# Patient Record
Sex: MALE
[De-identification: names, ages, dates, MRNs, and addresses within clinical notes are randomized per-mention and may not be internally consistent; named-entity substitution may affect disease eponyms.]

---

## 2017-05-11 ENCOUNTER — RX RENEWAL (OUTPATIENT)
Age: 72
End: 2017-05-11

## 2017-09-19 ENCOUNTER — RX RENEWAL (OUTPATIENT)
Age: 72
End: 2017-09-19

## 2017-10-18 ENCOUNTER — TRANSCRIPTION ENCOUNTER (OUTPATIENT)
Age: 72
End: 2017-10-18

## 2017-10-20 ENCOUNTER — NON-APPOINTMENT (OUTPATIENT)
Age: 72
End: 2017-10-20

## 2017-10-20 ENCOUNTER — APPOINTMENT (OUTPATIENT)
Dept: CARDIOLOGY | Facility: CLINIC | Age: 72
End: 2017-10-20
Payer: MEDICARE

## 2017-10-20 VITALS
HEIGHT: 60 IN | SYSTOLIC BLOOD PRESSURE: 104 MMHG | WEIGHT: 196 LBS | OXYGEN SATURATION: 98 % | HEART RATE: 56 BPM | DIASTOLIC BLOOD PRESSURE: 67 MMHG | BODY MASS INDEX: 38.48 KG/M2 | TEMPERATURE: 98.1 F

## 2017-10-20 DIAGNOSIS — E53.8 DEFICIENCY OF OTHER SPECIFIED B GROUP VITAMINS: ICD-10-CM

## 2017-10-20 PROCEDURE — G0008: CPT

## 2017-10-20 PROCEDURE — 90662 IIV NO PRSV INCREASED AG IM: CPT

## 2017-10-20 PROCEDURE — 93000 ELECTROCARDIOGRAM COMPLETE: CPT

## 2017-10-20 PROCEDURE — 99214 OFFICE O/P EST MOD 30 MIN: CPT | Mod: 25

## 2017-10-27 ENCOUNTER — MEDICATION RENEWAL (OUTPATIENT)
Age: 72
End: 2017-10-27

## 2018-10-30 ENCOUNTER — APPOINTMENT (OUTPATIENT)
Dept: CARDIOLOGY | Facility: CLINIC | Age: 73
End: 2018-10-30
Payer: MEDICARE

## 2018-10-30 VITALS
HEART RATE: 60 BPM | WEIGHT: 190 LBS | TEMPERATURE: 97.6 F | BODY MASS INDEX: 29.82 KG/M2 | OXYGEN SATURATION: 99 % | HEIGHT: 67 IN | SYSTOLIC BLOOD PRESSURE: 99 MMHG | DIASTOLIC BLOOD PRESSURE: 65 MMHG

## 2018-10-30 PROCEDURE — 99214 OFFICE O/P EST MOD 30 MIN: CPT

## 2018-11-16 ENCOUNTER — RX RENEWAL (OUTPATIENT)
Age: 73
End: 2018-11-16

## 2018-11-26 ENCOUNTER — MEDICATION RENEWAL (OUTPATIENT)
Age: 73
End: 2018-11-26

## 2019-09-10 ENCOUNTER — TRANSCRIPTION ENCOUNTER (OUTPATIENT)
Age: 74
End: 2019-09-10

## 2019-09-10 ENCOUNTER — APPOINTMENT (OUTPATIENT)
Dept: CARDIOLOGY | Facility: CLINIC | Age: 74
End: 2019-09-10
Payer: MEDICARE

## 2019-09-10 ENCOUNTER — NON-APPOINTMENT (OUTPATIENT)
Age: 74
End: 2019-09-10

## 2019-09-10 ENCOUNTER — LABORATORY RESULT (OUTPATIENT)
Age: 74
End: 2019-09-10

## 2019-09-10 VITALS
HEART RATE: 64 BPM | DIASTOLIC BLOOD PRESSURE: 67 MMHG | HEIGHT: 67 IN | TEMPERATURE: 98.2 F | WEIGHT: 196 LBS | SYSTOLIC BLOOD PRESSURE: 105 MMHG | OXYGEN SATURATION: 96 % | BODY MASS INDEX: 30.76 KG/M2

## 2019-09-10 DIAGNOSIS — R73.09 OTHER ABNORMAL GLUCOSE: ICD-10-CM

## 2019-09-10 PROCEDURE — 93000 ELECTROCARDIOGRAM COMPLETE: CPT

## 2019-09-10 PROCEDURE — 36415 COLL VENOUS BLD VENIPUNCTURE: CPT

## 2019-09-10 PROCEDURE — 99214 OFFICE O/P EST MOD 30 MIN: CPT

## 2019-09-10 NOTE — PHYSICAL EXAM
[General Appearance - Well Developed] : well developed [Normal Appearance] : normal appearance [Well Groomed] : well groomed [General Appearance - Well Nourished] : well nourished [General Appearance - In No Acute Distress] : no acute distress [No Deformities] : no deformities [Normal Conjunctiva] : the conjunctiva exhibited no abnormalities [Normal Oral Mucosa] : normal oral mucosa [Eyelids - No Xanthelasma] : the eyelids demonstrated no xanthelasmas [No Oral Pallor] : no oral pallor [No Oral Cyanosis] : no oral cyanosis [Normal Jugular Venous A Waves Present] : normal jugular venous A waves present [Normal Jugular Venous V Waves Present] : normal jugular venous V waves present [No Jugular Venous Bean A Waves] : no jugular venous bean A waves [Exaggerated Use Of Accessory Muscles For Inspiration] : no accessory muscle use [Respiration, Rhythm And Depth] : normal respiratory rhythm and effort [Heart Rate And Rhythm] : heart rate and rhythm were normal [Auscultation Breath Sounds / Voice Sounds] : lungs were clear to auscultation bilaterally [Murmurs] : no murmurs present [Heart Sounds] : normal S1 and S2 [Abdomen Soft] : soft [Abdomen Tenderness] : non-tender [Abnormal Walk] : normal gait [Abdomen Mass (___ Cm)] : no abdominal mass palpated [Gait - Sufficient For Exercise Testing] : the gait was sufficient for exercise testing [Nail Clubbing] : no clubbing of the fingernails [Cyanosis, Localized] : no localized cyanosis [Petechial Hemorrhages (___cm)] : no petechial hemorrhages [FreeTextEntry1] : No edema. Pedal pulses intact at 1+. No cogwheeling. [] : no rash [Skin Color & Pigmentation] : normal skin color and pigmentation [Skin Lesions] : no skin lesions [No Venous Stasis] : no venous stasis [No Skin Ulcers] : no skin ulcer [No Xanthoma] : no  xanthoma was observed [Oriented To Time, Place, And Person] : oriented to person, place, and time [Mood] : the mood was normal [Affect] : the affect was normal [No Anxiety] : not feeling anxious

## 2019-09-10 NOTE — REVIEW OF SYSTEMS
[Feeling Fatigued] : not feeling fatigued [Recent Weight Gain (___ Lbs)] : recent [unfilled] ~Ulb weight gain [Recent Weight Loss (___ Lbs)] : no recent weight loss [Dyspnea on exertion] : not dyspnea during exertion [Shortness Of Breath] : no shortness of breath [Chest  Pressure] : no chest pressure [Chest Pain] : no chest pain [Lower Ext Edema] : no extremity edema [Palpitations] : no palpitations [Heartburn] : no heartburn [Change in Appetite] : no change in appetite [see HPI] : see HPI [Dizziness] : no dizziness [Anxiety] : no anxiety [Tremor] : a tremor was seen [Under Stress] : not under stress [Negative] : Heme/Lymph

## 2019-09-10 NOTE — HISTORY OF PRESENT ILLNESS
[FreeTextEntry1] : Patient with multiple risk factors for coronary disease. While at [facility] in 1997 had chest pain and an evolving acute anterior wall MI. At [facility], [place] he was felt not to be a candidate for TPA and after an abnormal dobutamine echo on day 3 or 4 he was transferred to [facility]. On July 26, 1997 he had a catheterization which showed LVEF 35% with apical thrombus, virtually occluded LAD, normal circumflex artery, and 60-70% proximal RCA. After 3 days of IV heparin he had repeat catheterization which showed resolution of the thrombus. At that point he had a stent placed in his LAD which now had a 95% occlusion. The RCA was left alone. In December of 1997 there had obviously been some restenosis and he had a stent placed in his proximal LAD which had restenosed to 99% after the first septal branch, and a stent was placed in the proximal RCA which was now 75% narrowed. The patient did well after that although his LVEF at the time was only 30%. I began following the patient after his second angioplasty I have treated him medically since then. He has never required repeat catheterization, hospitalization for cardiac reasons, and has never developed congestive heart failure. His most recent stress tests include a stress echo in 2008 and a stress thallium in 2010. His left ventricular ejection fraction is now greater than 50% and he has no areas of ischemia although he still has a large area of akinesis in the mid septum and the anterior septum and apex. He has done a good job on risk factor reduction in terms of compliance with medical regimen but has not been active with exercise and weight loss. He runs borderline low blood pressures. He has the metabolic syndrome with low HDL and high triglycerides.  He denies chest pain, shortness of breath, or any arrhythmias.\par July 22, 2013. He was last here in November of 2012. At that time he did have a mild tremor of his right arm and his head. He saw Dr. Dariel Barreto of neurology in February who felt that the essential tremor was marked by nerves and did not require medications at this time. He wanted some thyroid function tests and he recommended the patient drink a little red wine. Patient is here today for followup. He Is not exercising only once in his diet but the wife keeps a monitored diet is here today his weight is the same as it was on his last visit.\par March 14, 2014. Patient returns today for followup. He had gained 4 pounds and his diet was not quite so good. He has some symptoms of BPH. He agreed to do a stress echo at his next visit.\par August 12, 2014. The patient is here for followup as well as stress echocardiogram. He denies any symptoms. ( he was a little upset that we were running late.) No interval change in his medications or his regimen. His daughter is due within this coming week in [place]. He was able to exercise for 9 minutes and there was no real change in the echocardiogram\par June 22, 2015. The patient is here for followup. No interval medical issues. No chest pain or shortness of breath. Played tennis the other day but does not really exercise on a regular basis. His wife thinks his tremor may be getting worse and involving his head as well although it doesn't bother the patient enough yet to go back to the neurologist.\par December 14, 2015. Patient is here for followup. No real change his symptoms. Because he moved, he has more up and down walking and he has managed to lose 5 pounds. Denies exertional shortness of breath or chest pain and still plays tennis but now rarely. No syncope or near syncope despite his bradycardia and borderline blood pressure. Only complaint is that his essential tremor seems to be getting worse. I initially added Zetia because LDL was above 70 but the cost was prohibitive for the patient.\par September 19, 2016. Patient is here in followup. He mostly spends time in [place] and has moved to [place]. He is hoping to eventually permanently move to [place], but his wife is still a little resistant. His activity level has decreased and his weight has gone up. He was able to play tennis 3 times for about an hour.  He is New York Heart Association class 2-3 at this point. No chest pressure, tightness, or heaviness. He saw neurology for intentional tremor and has a followup appointment tomorrow. He also went for a sleep study and has very mild sleep apnea and refuses to use the CPAP machine.\par October 20, 2017. First visit in a year. He had switched to Dr. Meneses for internal medicine, and because Dr. Meneses is also a cardiologist, he was seeing him exclusively initially, but now is back here. He claims his labs were okay also with Dr. Mik Alvarenga, whom he has been seeing for gout and is now on allopurinol and colchicine. No change in exercise tolerance or capacity. Weight is actually down 9 pounds from last year. EKG and exam are the same.\par October 30, 2018. First visit in a year once again. Absolutely no medical issues over the past 12 months. The only change is Dr. Meneses added 1 g of fish oil for triglycerides about 6-8 weeks ago and in Dr. Alvarenga increased allopurinol to 300 mg daily, and Colchrys every other day for his gout. Plan one tennis now than before because he is in [place] for half a year. Has lost weight and has absolutely no symptoms. Discussed the pros and cons of stress testing at this point, but we'll probably hold off.\par September 10, 2019.  Patient here in follow-up, this time came with his wife.  No new symptoms.  Still playing tennis a few days a week.  Gained back the 6 pounds he lost but otherwise no complaints.  His allopurinol is up to 300 mg and he is on colchicine.  Labs from Dr. Mik Alvarenga dated September 4 included a normal BMP and a normal CBC.  His uric acid was down to 4.6.  His CRP is 1.6 and his vitamin B12 is 407.  Lipids and a BNP were not done.  No chest pain, shortness of breath, syncope or near syncope.  EKG is sinus bradycardia at 56 with a first-degree block and is unchanged.  He also follows up with Dr. Alanis Meneses as internal medicine. Tremor is improved.

## 2019-09-10 NOTE — HISTORY OF PRESENT ILLNESS
[FreeTextEntry1] : Patient with multiple risk factors for coronary disease. While at [facility] in 1997 had chest pain and an evolving acute anterior wall MI. At [facility] he was felt not to be a candidate for TPA and after an abnormal dobutamine echo on day 3 or 4 he was transferred to [facility]. On July 26, 1997 he had a catheterization which showed LVEF 35% with apical thrombus, virtually occluded LAD, normal circumflex artery, and 60-70% proximal RCA. After 3 days of IV heparin he had repeat catheterization which showed resolution of the thrombus. At that point he had a stent placed in his LAD which now had a 95% occlusion. The RCA was left alone. In December of 1997 there had obviously been some restenosis and he had a stent placed in his proximal LAD which had restenosed to 99% after the first septal branch, and a stent was placed in the proximal RCA which was now 75% narrowed. The patient did well after that although his LVEF at the time was only 30%. I began following the patient after his second angioplasty I have treated him medically since then. He has never required repeat catheterization, hospitalization for cardiac reasons, and has never developed congestive heart failure. His most recent stress tests include a stress echo in 2008 and a stress thallium in 2010. His left ventricular ejection fraction is now greater than 50% and he has no areas of ischemia although he still has a large area of akinesis in the mid septum and the anterior septum and apex. He has done a good job on risk factor reduction in terms of compliance with medical regimen but has not been active with exercise and weight loss. He runs borderline low blood pressures. He has the metabolic syndrome with low HDL and high triglycerides.  He denies chest pain, shortness of breath, or any arrhythmias.\par July 22, 2013. He was last here in November of 2012. At that time he did have a mild tremor of his right arm and his head. He saw Dr. Dariel Barreto of neurology in February who felt that the essential tremor was marked by nerves and did not require medications at this time. He wanted some thyroid function tests and he recommended the patient drink a little red wine. Patient is here today for followup. He Is not exercising only once in his diet but the wife keeps a monitored diet is here today his weight is the same as it was on his last visit.\par March 14, 2014. Patient returns today for followup. He had gained 4 pounds and his diet was not quite so good. He has some symptoms of BPH. He agreed to do a stress echo at his next visit.\par August 12, 2014. The patient is here for followup as well as stress echocardiogram. He denies any symptoms. ( he was a little upset that we were running late.) No interval change in his medications or his regimen. His daughter is due within this coming week in [place]. He was able to exercise for 9 minutes and there was no real change in the echocardiogram\par June 22, 2015. The patient is here for followup. No interval medical issues. No chest pain or shortness of breath. Played tennis the other day but does not really exercise on a regular basis. His wife thinks his tremor may be getting worse and involving his head as well although it doesn't bother the patient enough yet to go back to the neurologist.\par December 14, 2015. Patient is here for followup. No real change his symptoms. Because he moved, he has more up and down walking and he has managed to lose 5 pounds. Denies exertional shortness of breath or chest pain and still plays tennis but now rarely. No syncope or near syncope despite his bradycardia and borderline blood pressure. Only complaint is that his essential tremor seems to be getting worse. I initially added Zetia because LDL was above 70 but the cost was prohibitive for the patient.\par September 19, 2016. Patient is here in followup. He mostly spends time in [place] and has moved to [place]. He is hoping to eventually permanently move to [place], but his wife is still a little resistant. His activity level has decreased and his weight has gone up. He was able to play tennis 3 times for about an hour.  He is New York Heart Association class 2-3 at this point. No chest pressure, tightness, or heaviness. He saw neurology for intentional tremor and has a followup appointment tomorrow. He also went for a sleep study and has very mild sleep apnea and refuses to use the CPAP machine.\par October 20, 2017. First visit in a year. He had switched to Dr. Meneses for internal medicine, and because Dr. Meneses is also a cardiologist, he was seeing him exclusively initially, but now is back here. He claims his labs were okay also with Dr. Mik Alvarenga, whom he has been seeing for gout and is now on allopurinol and colchicine. No change in exercise tolerance or capacity. Weight is actually down 9 pounds from last year. EKG and exam are the same.\par October 30, 2018. First visit in a year once again. Absolutely no medical issues over the past 12 months. The only change is Dr. Meneses added 1 g of fish oil for triglycerides about 6-8 weeks ago and in Dr. Alvarenga increased allopurinol to 300 mg daily, and Colchrys every other day for his gout. Plan one tennis now than before because he is in [place] for half a year. Has lost weight and has absolutely no symptoms. Discussed the pros and cons of stress testing at this point, but we'll probably hold off.\par September 10, 2019.  Patient here in follow-up, this time came with his wife.  No new symptoms.  Still playing tennis a few days a week.  Gained back the 6 pounds he lost but otherwise no complaints.  His allopurinol is up to 300 mg and he is on colchicine.  Labs from Dr. Mik Alvarenga dated September 4 included a normal BMP and a normal CBC.  His uric acid was down to 4.6.  His CRP is 1.6 and his vitamin B12 is 407.  Lipids and a BNP were not done.  No chest pain, shortness of breath, syncope or near syncope.  EKG is sinus bradycardia at 56 with a first-degree block and is unchanged.  He also follows up with Dr. Alanis Meneses as internal medicine. Tremor is improved.

## 2019-09-10 NOTE — PHYSICAL EXAM
[General Appearance - Well Developed] : well developed [Well Groomed] : well groomed [Normal Appearance] : normal appearance [General Appearance - Well Nourished] : well nourished [General Appearance - In No Acute Distress] : no acute distress [No Deformities] : no deformities [Normal Conjunctiva] : the conjunctiva exhibited no abnormalities [Eyelids - No Xanthelasma] : the eyelids demonstrated no xanthelasmas [Normal Oral Mucosa] : normal oral mucosa [No Oral Pallor] : no oral pallor [No Oral Cyanosis] : no oral cyanosis [Normal Jugular Venous A Waves Present] : normal jugular venous A waves present [Normal Jugular Venous V Waves Present] : normal jugular venous V waves present [No Jugular Venous Bean A Waves] : no jugular venous bean A waves [Exaggerated Use Of Accessory Muscles For Inspiration] : no accessory muscle use [Respiration, Rhythm And Depth] : normal respiratory rhythm and effort [Auscultation Breath Sounds / Voice Sounds] : lungs were clear to auscultation bilaterally [Heart Rate And Rhythm] : heart rate and rhythm were normal [Heart Sounds] : normal S1 and S2 [Murmurs] : no murmurs present [Abdomen Soft] : soft [Abdomen Tenderness] : non-tender [Abnormal Walk] : normal gait [Abdomen Mass (___ Cm)] : no abdominal mass palpated [Gait - Sufficient For Exercise Testing] : the gait was sufficient for exercise testing [Cyanosis, Localized] : no localized cyanosis [Nail Clubbing] : no clubbing of the fingernails [Petechial Hemorrhages (___cm)] : no petechial hemorrhages [] : no rash [FreeTextEntry1] : No edema. Pedal pulses intact at 1+. No cogwheeling. [Skin Color & Pigmentation] : normal skin color and pigmentation [No Venous Stasis] : no venous stasis [Skin Lesions] : no skin lesions [No Skin Ulcers] : no skin ulcer [No Xanthoma] : no  xanthoma was observed [Oriented To Time, Place, And Person] : oriented to person, place, and time [Affect] : the affect was normal [Mood] : the mood was normal [No Anxiety] : not feeling anxious

## 2019-09-10 NOTE — REVIEW OF SYSTEMS
[Feeling Fatigued] : not feeling fatigued [Recent Weight Gain (___ Lbs)] : recent [unfilled] ~Ulb weight gain [Recent Weight Loss (___ Lbs)] : no recent weight loss [Dyspnea on exertion] : not dyspnea during exertion [Shortness Of Breath] : no shortness of breath [Chest  Pressure] : no chest pressure [Chest Pain] : no chest pain [Lower Ext Edema] : no extremity edema [Palpitations] : no palpitations [Change in Appetite] : no change in appetite [Heartburn] : no heartburn [Dizziness] : no dizziness [see HPI] : see HPI [Tremor] : a tremor was seen [Anxiety] : no anxiety [Under Stress] : not under stress [Negative] : Endocrine

## 2019-09-10 NOTE — CARDIOLOGY SUMMARY
[No Ischemia] : no Ischemia [Ant Wall Defect] : anterior wall defect [Fixed Defect] : fixed defect [___] : [unfilled] [LVEF ___%] : LVEF [unfilled]% [Enlarged] : enlarged LA size [Moderate] : moderate LV dysfunction [None] : no mitral regurgitation

## 2019-09-12 ENCOUNTER — RX RENEWAL (OUTPATIENT)
Age: 74
End: 2019-09-12

## 2019-09-12 LAB
CHOLEST SERPL-MCNC: 175 MG/DL
CHOLEST/HDLC SERPL: 5.8 RATIO
ESTIMATED AVERAGE GLUCOSE: 120 MG/DL
HBA1C MFR BLD HPLC: 5.8 %
HDLC SERPL-MCNC: 30 MG/DL
LDLC SERPL CALC-MCNC: NORMAL MG/DL
NT-PROBNP SERPL-MCNC: 315 PG/ML
TRIGL SERPL-MCNC: 440 MG/DL
TSH SERPL-ACNC: 1.73 UIU/ML

## 2019-11-20 ENCOUNTER — RECORD ABSTRACTING (OUTPATIENT)
Age: 74
End: 2019-11-20

## 2019-11-20 DIAGNOSIS — Z79.899 OTHER LONG TERM (CURRENT) DRUG THERAPY: ICD-10-CM

## 2019-11-20 DIAGNOSIS — Z13.1 ENCOUNTER FOR SCREENING FOR DIABETES MELLITUS: ICD-10-CM

## 2019-11-20 DIAGNOSIS — Z82.49 FAMILY HISTORY OF ISCHEMIC HEART DISEASE AND OTHER DISEASES OF THE CIRCULATORY SYSTEM: ICD-10-CM

## 2019-11-25 ENCOUNTER — APPOINTMENT (OUTPATIENT)
Dept: INTERNAL MEDICINE | Facility: CLINIC | Age: 74
End: 2019-11-25

## 2019-12-30 ENCOUNTER — APPOINTMENT (OUTPATIENT)
Dept: INTERNAL MEDICINE | Facility: CLINIC | Age: 74
End: 2019-12-30
Payer: MEDICARE

## 2019-12-30 ENCOUNTER — NON-APPOINTMENT (OUTPATIENT)
Age: 74
End: 2019-12-30

## 2019-12-30 VITALS
HEART RATE: 54 BPM | OXYGEN SATURATION: 98 % | WEIGHT: 192 LBS | HEIGHT: 66 IN | SYSTOLIC BLOOD PRESSURE: 110 MMHG | BODY MASS INDEX: 30.86 KG/M2 | DIASTOLIC BLOOD PRESSURE: 68 MMHG

## 2019-12-30 DIAGNOSIS — Z81.8 FAMILY HISTORY OF OTHER MENTAL AND BEHAVIORAL DISORDERS: ICD-10-CM

## 2019-12-30 LAB
BILIRUB UR QL STRIP: NEGATIVE
CLARITY UR: CLEAR
COLLECTION METHOD: NORMAL
GLUCOSE UR-MCNC: NEGATIVE
HCG UR QL: NORMAL EU/DL
HGB UR QL STRIP.AUTO: NEGATIVE
KETONES UR-MCNC: NEGATIVE
LEUKOCYTE ESTERASE UR QL STRIP: NEGATIVE
NITRITE UR QL STRIP: NEGATIVE
PH UR STRIP: 6
PROT UR STRIP-MCNC: NEGATIVE
SP GR UR STRIP: 1.02

## 2019-12-30 PROCEDURE — 36415 COLL VENOUS BLD VENIPUNCTURE: CPT

## 2019-12-30 PROCEDURE — 99397 PER PM REEVAL EST PAT 65+ YR: CPT | Mod: 25

## 2019-12-30 PROCEDURE — 81003 URINALYSIS AUTO W/O SCOPE: CPT | Mod: QW

## 2019-12-30 PROCEDURE — G0442 ANNUAL ALCOHOL SCREEN 15 MIN: CPT

## 2019-12-30 PROCEDURE — 93000 ELECTROCARDIOGRAM COMPLETE: CPT

## 2019-12-30 RX ORDER — OMEGA-3/DHA/EPA/FISH OIL 300-1000MG
1000 CAPSULE ORAL
Refills: 0 | Status: DISCONTINUED | COMMUNITY
End: 2019-12-30

## 2019-12-30 NOTE — HISTORY OF PRESENT ILLNESS
[FreeTextEntry1] : CPE [de-identified] : The patient is being seen for a health maintenance evaluation.\par Exercise: The patient plays tennis 2-3 x/week\par Diet: No formal diet \par Dental: Has had dental exam annually\par Hearing: normal \par Vision. Glasses: reading\par             Checks: annually \par Dr. Galloway plans an MRI of the prostate in a few months\par

## 2019-12-30 NOTE — PHYSICAL EXAM
[No Acute Distress] : no acute distress [Well Developed] : well developed [Well Nourished] : well nourished [Well-Appearing] : well-appearing [Normal Voice/Communication] : normal voice/communication [Normal Sclera/Conjunctiva] : normal sclera/conjunctiva [EOMI] : extraocular movements intact [Normal Outer Ear/Nose] : the outer ears and nose were normal in appearance [Normal Oropharynx] : the oropharynx was normal [Normal TMs] : both tympanic membranes were normal [No JVD] : no jugular venous distention [No Lymphadenopathy] : no lymphadenopathy [Supple] : supple [Thyroid Normal, No Nodules] : the thyroid was normal and there were no nodules present [No Respiratory Distress] : no respiratory distress  [No Accessory Muscle Use] : no accessory muscle use [Clear to Auscultation] : lungs were clear to auscultation bilaterally [Normal Rate] : normal rate  [Regular Rhythm] : with a regular rhythm [Normal S1, S2] : normal S1 and S2 [No Murmur] : no murmur heard [No Carotid Bruits] : no carotid bruits [No Abdominal Bruit] : a ~M bruit was not heard ~T in the abdomen [No Varicosities] : no varicosities [No Edema] : there was no peripheral edema [No Palpable Aorta] : no palpable aorta [No Extremity Clubbing/Cyanosis] : no extremity clubbing/cyanosis [No Pitting Edema] : no pitting edema present [2+] : right 2+ [1+] : right 1+ [0] : right 0 [Normal Appearance] : normal in appearance [No Nipple Discharge] : no nipple discharge [No Axillary Lymphadenopathy] : no axillary lymphadenopathy [Soft] : abdomen soft [Non Tender] : non-tender [Non-distended] : non-distended [No Masses] : no abdominal mass palpated [No HSM] : no HSM [Declined Rectal Exam] : declined rectal exam [Normal Bowel Sounds] : normal bowel sounds [Normal Supraclavicular Nodes] : no supraclavicular lymphadenopathy [Normal Axillary Nodes] : no axillary lymphadenopathy [No CVA Tenderness] : no CVA  tenderness [Normal Posterior Cervical Nodes] : no posterior cervical lymphadenopathy [Normal Anterior Cervical Nodes] : no anterior cervical lymphadenopathy [No Spinal Tenderness] : no spinal tenderness [Grossly Normal Strength/Tone] : grossly normal strength/tone [No Joint Swelling] : no joint swelling [No Skin Lesions] : no skin lesions [No Rash] : no rash [No Focal Deficits] : no focal deficits [Coordination Grossly Intact] : coordination grossly intact [Normal Gait] : normal gait [Memory Grossly Normal] : memory grossly normal [Speech Grossly Normal] : speech grossly normal [Normal Affect] : the affect was normal [Alert and Oriented x3] : oriented to person, place, and time [Normal Mood] : the mood was normal [Normal Insight/Judgement] : insight and judgment were intact [Comprehensive Foot Exam Normal] : Right and left foot were examined and both feet are normal. No ulcers in either foot. Toes are normal and with full ROM.  Normal tactile sensation with monofilament testing throughout both feet [Kyphosis] : no kyphosis [Scoliosis] : no scoliosis [Acne] : no acne [de-identified] : obese [de-identified] : normal capillary refill [FreeTextEntry1] : Is being followed by Dr. Kuldeep Galloway for Urology, was seen 3 months ago

## 2019-12-30 NOTE — PLAN
[FreeTextEntry1] : Suggest change to atorvastatin 40 mg OD as high dose statin with fewer side effects than simvastatin 80 mg. \par Fenofibrate 134 mg OD micronized if covered.  Gemfibrozil may be covered if fenofibrate is not. \par Recheck lipids and LFTs in 6 weeks. \par

## 2019-12-30 NOTE — REVIEW OF SYSTEMS
[Nocturia] : nocturia [Negative] : ENT [Fever] : no fever [Chills] : no chills [Fatigue] : no fatigue [Night Sweats] : no night sweats [Hot Flashes] : no hot flashes [Palpitations] : no palpitations [Chest Pain] : no chest pain [Claudication] : no  leg claudication [Shortness Of Breath] : no shortness of breath [Lower Ext Edema] : no lower extremity edema [Cough] : no cough [Wheezing] : no wheezing [Abdominal Pain] : no abdominal pain [Nausea] : no nausea [Constipation] : no constipation [Heartburn] : no heartburn [Diarrhea] : no diarrhea [Joint Pain] : no joint pain [Frequency] : no frequency [Muscle Pain] : no muscle pain [Back Pain] : no back pain [Joint Stiffness] : no joint stiffness [Itching] : no itching [Joint Swelling] : no joint swelling [Skin Rash] : no skin rash [Headache] : no headache [Dizziness] : no dizziness [Easy Bleeding] : no easy bleeding [Easy Bruising] : no easy bruising [Swollen Glands] : no swollen glands

## 2019-12-30 NOTE — HEALTH RISK ASSESSMENT
[Good] : ~his/her~  mood as  good [No] : In the past 12 months have you used drugs other than those required for medical reasons? No [No falls in past year] : Patient reported no falls in the past year [0] : 2) Feeling down, depressed, or hopeless: Not at all (0) [None] : None [With Significant Other] : lives with significant other [Retired] : retired [] :  [Fully functional (bathing, dressing, toileting, transferring, walking, feeding)] : Fully functional (bathing, dressing, toileting, transferring, walking, feeding) [Fully functional (using the telephone, shopping, preparing meals, housekeeping, doing laundry, using] : Fully functional and needs no help or supervision to perform IADLs (using the telephone, shopping, preparing meals, housekeeping, doing laundry, using transportation, managing medications and managing finances) [With Patient/Caregiver] : With Patient/Caregiver [] : No [Change in mental status noted] : No change in mental status noted [ColonoscopyDate] : 2007 [AdvancecareDate] : 12/30/2019

## 2019-12-30 NOTE — ASSESSMENT
[FreeTextEntry1] : Very high triglycerides=440 probably fasting, in September 2019. Is on simvastatin 80 mg. He needs a statin b/o coronary artery disease.

## 2019-12-30 NOTE — RESULTS/DATA
[NSR] : normal sinus rhythm [P Waves Normal] : the P wave is normal [ECG Intervals LA.] : LA interval is normal [Q Waves] : Q-waves are present [V1] : V1 [V2] : V2 [V4] : V4 [Anterior Wall] : of the anterior wall [No Interval Change] : no interval change

## 2020-01-06 ENCOUNTER — MEDICATION RENEWAL (OUTPATIENT)
Age: 75
End: 2020-01-06

## 2020-01-09 ENCOUNTER — RX RENEWAL (OUTPATIENT)
Age: 75
End: 2020-01-09

## 2020-07-23 ENCOUNTER — APPOINTMENT (OUTPATIENT)
Dept: CARDIOLOGY | Facility: CLINIC | Age: 75
End: 2020-07-23
Payer: MEDICARE

## 2020-07-23 ENCOUNTER — NON-APPOINTMENT (OUTPATIENT)
Age: 75
End: 2020-07-23

## 2020-07-23 VITALS
HEART RATE: 67 BPM | DIASTOLIC BLOOD PRESSURE: 74 MMHG | WEIGHT: 195 LBS | OXYGEN SATURATION: 99 % | HEIGHT: 66 IN | BODY MASS INDEX: 31.34 KG/M2 | SYSTOLIC BLOOD PRESSURE: 122 MMHG | TEMPERATURE: 97.7 F | RESPIRATION RATE: 17 BRPM

## 2020-07-23 VITALS — SYSTOLIC BLOOD PRESSURE: 122 MMHG | HEART RATE: 68 BPM | DIASTOLIC BLOOD PRESSURE: 70 MMHG

## 2020-07-23 DIAGNOSIS — E55.9 VITAMIN D DEFICIENCY, UNSPECIFIED: ICD-10-CM

## 2020-07-23 PROCEDURE — 99214 OFFICE O/P EST MOD 30 MIN: CPT

## 2020-07-23 PROCEDURE — 93000 ELECTROCARDIOGRAM COMPLETE: CPT

## 2020-07-23 PROCEDURE — 36415 COLL VENOUS BLD VENIPUNCTURE: CPT

## 2020-07-23 NOTE — PHYSICAL EXAM
[Normal Appearance] : normal appearance [General Appearance - Well Developed] : well developed [Well Groomed] : well groomed [General Appearance - Well Nourished] : well nourished [No Deformities] : no deformities [General Appearance - In No Acute Distress] : no acute distress [Eyelids - No Xanthelasma] : the eyelids demonstrated no xanthelasmas [Normal Conjunctiva] : the conjunctiva exhibited no abnormalities [Normal Oral Mucosa] : normal oral mucosa [No Oral Pallor] : no oral pallor [No Oral Cyanosis] : no oral cyanosis [Normal Jugular Venous A Waves Present] : normal jugular venous A waves present [Normal Jugular Venous V Waves Present] : normal jugular venous V waves present [No Jugular Venous Bean A Waves] : no jugular venous bean A waves [Respiration, Rhythm And Depth] : normal respiratory rhythm and effort [Exaggerated Use Of Accessory Muscles For Inspiration] : no accessory muscle use [Auscultation Breath Sounds / Voice Sounds] : lungs were clear to auscultation bilaterally [Heart Rate And Rhythm] : heart rate and rhythm were normal [Murmurs] : no murmurs present [Heart Sounds] : normal S1 and S2 [Abdomen Soft] : soft [Abdomen Tenderness] : non-tender [Abdomen Mass (___ Cm)] : no abdominal mass palpated [Abnormal Walk] : normal gait [Gait - Sufficient For Exercise Testing] : the gait was sufficient for exercise testing [Nail Clubbing] : no clubbing of the fingernails [Cyanosis, Localized] : no localized cyanosis [Petechial Hemorrhages (___cm)] : no petechial hemorrhages [] : no rash [Skin Color & Pigmentation] : normal skin color and pigmentation [No Venous Stasis] : no venous stasis [No Skin Ulcers] : no skin ulcer [Skin Lesions] : no skin lesions [Oriented To Time, Place, And Person] : oriented to person, place, and time [No Xanthoma] : no  xanthoma was observed [Affect] : the affect was normal [Mood] : the mood was normal [No Anxiety] : not feeling anxious [FreeTextEntry1] : No edema. Pedal pulses intact at 1+. No cogwheeling.

## 2020-07-23 NOTE — HISTORY OF PRESENT ILLNESS
[FreeTextEntry1] : Patient with multiple risk factors for coronary disease. While at [place] in 1997 had chest pain and an evolving acute anterior wall MI. At [facility] he was felt not to be a candidate for TPA and after an abnormal dobutamine echo on day 3 or 4 he was transferred to [facility]. On July 26, 1997 he had a catheterization which showed LVEF 35% with apical thrombus, virtually occluded LAD, normal circumflex artery, and 60-70% proximal RCA. After 3 days of IV heparin he had repeat catheterization which showed resolution of the thrombus. At that point he had a stent placed in his LAD which now had a 95% occlusion. The RCA was left alone. In December of 1997 there had obviously been some restenosis and he had a stent placed in his proximal LAD which had restenosed to 99% after the first septal branch, and a stent was placed in the proximal RCA which was now 75% narrowed. The patient did well after that although his LVEF at the time was only 30%. I began following the patient after his second angioplasty I have treated him medically since then. He has never required repeat catheterization, hospitalization for cardiac reasons, and has never developed congestive heart failure. His most recent stress tests include a stress echo in 2008 and a stress thallium in 2010. His left ventricular ejection fraction is now greater than 50% and he has no areas of ischemia although he still has a large area of akinesis in the mid septum and the anterior septum and apex. He has done a good job on risk factor reduction in terms of compliance with medical regimen but has not been active with exercise and weight loss. He runs borderline low blood pressures. He has the metabolic syndrome with low HDL and high triglycerides.  He denies chest pain, shortness of breath, or any arrhythmias.\par July 22, 2013. He was last here in November of 2012. At that time he did have a mild tremor of his right arm and his head. He saw Dr. Dariel Barreto of neurology in February who felt that the essential tremor was marked by nerves and did not require medications at this time. He wanted some thyroid function tests and he recommended the patient drink a little red wine. Patient is here today for followup. He Is not exercising only once in his diet but the wife keeps a monitored diet is here today his weight is the same as it was on his last visit.\par March 14, 2014. Patient returns today for followup. He had gained 4 pounds and his diet was not quite so good. He has some symptoms of BPH. He agreed to do a stress echo at his next visit.\par August 12, 2014. The patient is here for followup as well as stress echocardiogram. He denies any symptoms. ( he was a little upset that we were running late.) No interval change in his medications or his regimen. His daughter is due within this coming week in [place]. He was able to exercise for 9 minutes and there was no real change in the echocardiogram\par June 22, 2015. The patient is here for followup. No interval medical issues. No chest pain or shortness of breath. Played tennis the other day but does not really exercise on a regular basis. His wife thinks his tremor may be getting worse and involving his head as well although it doesn't bother the patient enough yet to go back to the neurologist.\par December 14, 2015. Patient is here for followup. No real change his symptoms. Because he moved, he has more up and down walking and he has managed to lose 5 pounds. Denies exertional shortness of breath or chest pain and still plays tennis but now rarely. No syncope or near syncope despite his bradycardia and borderline blood pressure. Only complaint is that his essential tremor seems to be getting worse. I initially added Zetia because LDL was above 70 but the cost was prohibitive for the patient.\par September 19, 2016. Patient is here in followup. He mostly spends time in [place] and has moved to [place]. He is hoping to eventually permanently move to [place], but his wife is still a little resistant. His activity level has decreased and his weight has gone up. He was able to play tennis 3 times for about an hour.  He is New York Heart Association class 2-3 at this point. No chest pressure, tightness, or heaviness. He saw neurology for intentional tremor and has a followup appointment tomorrow. He also went for a sleep study and has very mild sleep apnea and refuses to use the CPAP machine.\par October 20, 2017. First visit in a year. He had switched to Dr. Meneses for internal medicine, and because Dr. Meneses is also a cardiologist, he was seeing him exclusively initially, but now is back here. He claims his labs were okay also with Dr. Mik Alvarenga, whom he has been seeing for gout and is now on allopurinol and colchicine. No change in exercise tolerance or capacity. Weight is actually down 9 pounds from last year. EKG and exam are the same.\par October 30, 2018. First visit in a year once again. Absolutely no medical issues over the past 12 months. The only change is Dr. Meneses added 1 g of fish oil for triglycerides about 6-8 weeks ago and in Dr. Alvarenga increased allopurinol to 300 mg daily, and Colchrys every other day for his gout. Plan one tennis now than before because he is in [place] for half a year. Has lost weight and has absolutely no symptoms. Discussed the pros and cons of stress testing at this point, but we'll probably hold off.\par September 10, 2019.  Patient here in follow-up, this time came with his wife.  No new symptoms.  Still playing tennis a few days a week.  Gained back the 6 pounds he lost but otherwise no complaints.  His allopurinol is up to 300 mg and he is on colchicine.  Labs from Dr. Mik Alvarenga dated September 4 included a normal BMP and a normal CBC.  His uric acid was down to 4.6.  His CRP is 1.6 and his vitamin B12 is 407.  Lipids and a BNP were not done.  No chest pain, shortness of breath, syncope or near syncope.  EKG is sinus bradycardia at 56 with a first-degree block and is unchanged.  He also follows up with Dr. Alanis Meneses as internal medicine. Tremor is improved..\par July 23, 2020.  Patient here in follow-up.  Had his annual wellness visit with Dr. Castro on December 30.  His triglycerides was 440 and he recommended fenofibrate plus changing the simvastatin to atorvastatin.\par Still playing tennis twice a week except for the first month of COVID.  Back from [place] for 1 month now.  No symptoms of COVID.  Essential tremor may be a little worse and having some issues with his memory.  Had a previous work-up with Dr. Orlin Freeman of neurology.  Here for labs, (only had half a plum this morning)

## 2020-07-23 NOTE — REVIEW OF SYSTEMS
[Tremor] : a tremor was seen [Negative] : Endocrine [Recent Weight Gain (___ Lbs)] : no recent weight gain [Feeling Fatigued] : not feeling fatigued [Recent Weight Loss (___ Lbs)] : no recent weight loss [Shortness Of Breath] : no shortness of breath [Dyspnea on exertion] : not dyspnea during exertion [Chest  Pressure] : no chest pressure [Lower Ext Edema] : no extremity edema [Chest Pain] : no chest pain [Heartburn] : no heartburn [Palpitations] : no palpitations [Dizziness] : no dizziness [Change in Appetite] : no change in appetite [see HPI] : see HPI [Memory Lapses Or Loss] : memory lapses or loss [Anxiety] : no anxiety [Under Stress] : not under stress

## 2020-07-24 LAB
25(OH)D3 SERPL-MCNC: 30.6 NG/ML
ALBUMIN SERPL ELPH-MCNC: 4.9 G/DL
ALP BLD-CCNC: 64 U/L
ALT SERPL-CCNC: 26 U/L
ANION GAP SERPL CALC-SCNC: 16 MMOL/L
AST SERPL-CCNC: 23 U/L
BASOPHILS # BLD AUTO: 0.05 K/UL
BASOPHILS NFR BLD AUTO: 0.7 %
BILIRUB SERPL-MCNC: 0.6 MG/DL
BUN SERPL-MCNC: 17 MG/DL
CALCIUM SERPL-MCNC: 10 MG/DL
CHLORIDE SERPL-SCNC: 101 MMOL/L
CHOLEST SERPL-MCNC: 184 MG/DL
CHOLEST/HDLC SERPL: 4.7 RATIO
CK SERPL-CCNC: 82 U/L
CO2 SERPL-SCNC: 24 MMOL/L
CREAT SERPL-MCNC: 1.1 MG/DL
EOSINOPHIL # BLD AUTO: 0.19 K/UL
EOSINOPHIL NFR BLD AUTO: 2.7 %
ESTIMATED AVERAGE GLUCOSE: 114 MG/DL
GLUCOSE SERPL-MCNC: 102 MG/DL
HBA1C MFR BLD HPLC: 5.6 %
HCT VFR BLD CALC: 47.5 %
HDLC SERPL-MCNC: 40 MG/DL
HGB BLD-MCNC: 15.3 G/DL
IMM GRANULOCYTES NFR BLD AUTO: 0.9 %
LDLC SERPL CALC-MCNC: 92 MG/DL
LYMPHOCYTES # BLD AUTO: 1.94 K/UL
LYMPHOCYTES NFR BLD AUTO: 28 %
MAN DIFF?: NORMAL
MCHC RBC-ENTMCNC: 31.5 PG
MCHC RBC-ENTMCNC: 32.2 GM/DL
MCV RBC AUTO: 97.9 FL
MONOCYTES # BLD AUTO: 0.66 K/UL
MONOCYTES NFR BLD AUTO: 9.5 %
NEUTROPHILS # BLD AUTO: 4.03 K/UL
NEUTROPHILS NFR BLD AUTO: 58.2 %
NT-PROBNP SERPL-MCNC: 239 PG/ML
PLATELET # BLD AUTO: 203 K/UL
POTASSIUM SERPL-SCNC: 5 MMOL/L
PROT SERPL-MCNC: 7.5 G/DL
PSA SERPL-MCNC: 6.06 NG/ML
RBC # BLD: 4.85 M/UL
RBC # FLD: 12.6 %
SODIUM SERPL-SCNC: 141 MMOL/L
TRIGL SERPL-MCNC: 266 MG/DL
TSH SERPL-ACNC: 1.27 UIU/ML
URATE SERPL-MCNC: 4.5 MG/DL
VIT B12 SERPL-MCNC: 520 PG/ML
WBC # FLD AUTO: 6.93 K/UL

## 2020-09-24 ENCOUNTER — APPOINTMENT (OUTPATIENT)
Dept: CARDIOLOGY | Facility: CLINIC | Age: 75
End: 2020-09-24

## 2020-12-02 ENCOUNTER — APPOINTMENT (OUTPATIENT)
Dept: INTERNAL MEDICINE | Facility: CLINIC | Age: 75
End: 2020-12-02
Payer: MEDICARE

## 2020-12-02 VITALS
TEMPERATURE: 99.2 F | SYSTOLIC BLOOD PRESSURE: 120 MMHG | DIASTOLIC BLOOD PRESSURE: 70 MMHG | HEART RATE: 70 BPM | WEIGHT: 192 LBS | HEIGHT: 66 IN | BODY MASS INDEX: 30.86 KG/M2

## 2020-12-02 DIAGNOSIS — Z87.898 PERSONAL HISTORY OF OTHER SPECIFIED CONDITIONS: ICD-10-CM

## 2020-12-02 DIAGNOSIS — M10.9 GOUT, UNSPECIFIED: ICD-10-CM

## 2020-12-02 DIAGNOSIS — Z23 ENCOUNTER FOR IMMUNIZATION: ICD-10-CM

## 2020-12-02 PROCEDURE — 36415 COLL VENOUS BLD VENIPUNCTURE: CPT

## 2020-12-02 PROCEDURE — 99214 OFFICE O/P EST MOD 30 MIN: CPT | Mod: 25

## 2020-12-02 PROCEDURE — 90732 PPSV23 VACC 2 YRS+ SUBQ/IM: CPT

## 2020-12-02 PROCEDURE — G0009: CPT

## 2020-12-02 PROCEDURE — 99072 ADDL SUPL MATRL&STAF TM PHE: CPT

## 2020-12-02 NOTE — HISTORY OF PRESENT ILLNESS
[FreeTextEntry1] : follow up [de-identified] : Feels OK. Back from [place] about 10 days ago and negative for COVID on two recent tests. \par Had ECG in July 2020 at Dr. Freeman.\par SOB, chest pain, pressure, palpitations, cough wheeze, all negative. \par Exercising three times per week-tennis, and walks with his wife occasionally. \par Appetite OK. \par Weight stable or lower than past--192 today 195 in July. \par Tremor in hands seems worse, sees neurologist, had MRIs no changes reported. Does not drink alcohol. Gets worse when he eats soup or holds up the newspaper. \par Sees  as well,had MRI of prostate. at [facility] was negative.

## 2020-12-02 NOTE — PHYSICAL EXAM
[No Acute Distress] : no acute distress [Well Nourished] : well nourished [Well Developed] : well developed [Well-Appearing] : well-appearing [Normal Voice/Communication] : normal voice/communication [No JVD] : no jugular venous distention [No Lymphadenopathy] : no lymphadenopathy [Supple] : supple [No Respiratory Distress] : no respiratory distress  [No Accessory Muscle Use] : no accessory muscle use [Clear to Auscultation] : lungs were clear to auscultation bilaterally [Normal Percussion] : the chest was normal to percussion [Normal Rate] : normal rate  [Regular Rhythm] : with a regular rhythm [Normal S1, S2] : normal S1 and S2 [No Murmur] : no murmur heard [No Carotid Bruits] : no carotid bruits [No Abdominal Bruit] : a ~M bruit was not heard ~T in the abdomen [No Edema] : there was no peripheral edema [No Palpable Aorta] : no palpable aorta [Normal Appearance] : normal in appearance [No Masses] : no palpable masses [No Nipple Discharge] : no nipple discharge [No Axillary Lymphadenopathy] : no axillary lymphadenopathy [Soft] : abdomen soft [Non Tender] : non-tender [Non-distended] : non-distended [No HSM] : no HSM [Normal Bowel Sounds] : normal bowel sounds [Normal Supraclavicular Nodes] : no supraclavicular lymphadenopathy [No CVA Tenderness] : no CVA  tenderness [No Spinal Tenderness] : no spinal tenderness [No Joint Swelling] : no joint swelling [Grossly Normal Strength/Tone] : grossly normal strength/tone [No Skin Lesions] : no skin lesions [Speech Grossly Normal] : speech grossly normal [Memory Grossly Normal] : memory grossly normal [Normal Mood] : the mood was normal [Normal Insight/Judgement] : insight and judgment were intact [de-identified] : tremor right hand at rest

## 2020-12-03 LAB
ALBUMIN SERPL ELPH-MCNC: 4.8 G/DL
ALP BLD-CCNC: 75 U/L
ALT SERPL-CCNC: 21 U/L
ANION GAP SERPL CALC-SCNC: 11 MMOL/L
AST SERPL-CCNC: 20 U/L
BILIRUB SERPL-MCNC: 0.7 MG/DL
BUN SERPL-MCNC: 19 MG/DL
CALCIUM SERPL-MCNC: 9.5 MG/DL
CHLORIDE SERPL-SCNC: 103 MMOL/L
CHOLEST SERPL-MCNC: 181 MG/DL
CK SERPL-CCNC: 156 U/L
CO2 SERPL-SCNC: 23 MMOL/L
CREAT SERPL-MCNC: 1.1 MG/DL
GLUCOSE SERPL-MCNC: 102 MG/DL
HDLC SERPL-MCNC: 41 MG/DL
LDLC SERPL CALC-MCNC: 113 MG/DL
NONHDLC SERPL-MCNC: 141 MG/DL
POTASSIUM SERPL-SCNC: 4.8 MMOL/L
PROT SERPL-MCNC: 7.4 G/DL
SODIUM SERPL-SCNC: 137 MMOL/L
TRIGL SERPL-MCNC: 139 MG/DL
URATE SERPL-MCNC: 4.4 MG/DL

## 2020-12-09 RX ORDER — COLCHICINE 0.6 MG/1
0.6 TABLET ORAL DAILY
Qty: 90 | Refills: 3 | Status: ACTIVE | COMMUNITY
Start: 2017-10-20 | End: 1900-01-01

## 2020-12-22 ENCOUNTER — APPOINTMENT (OUTPATIENT)
Dept: INTERNAL MEDICINE | Facility: CLINIC | Age: 75
End: 2020-12-22

## 2021-06-03 NOTE — CARDIOLOGY SUMMARY
Addended by: FELIPE JASSO on: 6/3/2021 02:09 PM     Modules accepted: Orders     [No Ischemia] : no Ischemia [Ant Wall Defect] : anterior wall defect [Fixed Defect] : fixed defect [___] : [unfilled] [LVEF ___%] : LVEF [unfilled]% [Moderate] : moderate LV dysfunction [Enlarged] : enlarged LA size [None] : no mitral regurgitation

## 2021-08-04 ENCOUNTER — APPOINTMENT (OUTPATIENT)
Dept: CARDIOLOGY | Facility: CLINIC | Age: 76
End: 2021-08-04
Payer: MEDICARE

## 2021-08-04 ENCOUNTER — NON-APPOINTMENT (OUTPATIENT)
Age: 76
End: 2021-08-04

## 2021-08-04 VITALS
BODY MASS INDEX: 31.82 KG/M2 | RESPIRATION RATE: 17 BRPM | HEIGHT: 66 IN | SYSTOLIC BLOOD PRESSURE: 129 MMHG | DIASTOLIC BLOOD PRESSURE: 77 MMHG | WEIGHT: 198 LBS | HEART RATE: 59 BPM | OXYGEN SATURATION: 100 %

## 2021-08-04 DIAGNOSIS — I34.0 NONRHEUMATIC MITRAL (VALVE) INSUFFICIENCY: ICD-10-CM

## 2021-08-04 DIAGNOSIS — I10 ESSENTIAL (PRIMARY) HYPERTENSION: ICD-10-CM

## 2021-08-04 PROCEDURE — 93000 ELECTROCARDIOGRAM COMPLETE: CPT

## 2021-08-04 PROCEDURE — 36415 COLL VENOUS BLD VENIPUNCTURE: CPT

## 2021-08-04 PROCEDURE — 99214 OFFICE O/P EST MOD 30 MIN: CPT

## 2021-08-04 NOTE — CARDIOLOGY SUMMARY
[No Ischemia] : no Ischemia [Ant Wall Defect] : anterior wall defect [Fixed Defect] : fixed defect [___] : [unfilled] [LVEF ___%] : LVEF [unfilled]% [Moderate] : moderate LV dysfunction [Enlarged] : enlarged LA size [None] : no mitral regurgitation [___] : [unfilled]

## 2021-08-05 LAB
ALBUMIN SERPL ELPH-MCNC: 4.8 G/DL
ALP BLD-CCNC: 66 U/L
ALT SERPL-CCNC: 24 U/L
ANION GAP SERPL CALC-SCNC: 14 MMOL/L
AST SERPL-CCNC: 23 U/L
BASOPHILS # BLD AUTO: 0.04 K/UL
BASOPHILS NFR BLD AUTO: 0.6 %
BILIRUB SERPL-MCNC: 0.6 MG/DL
BUN SERPL-MCNC: 18 MG/DL
CALCIUM SERPL-MCNC: 10 MG/DL
CHLORIDE SERPL-SCNC: 103 MMOL/L
CHOLEST SERPL-MCNC: 178 MG/DL
CK SERPL-CCNC: 109 U/L
CO2 SERPL-SCNC: 23 MMOL/L
CREAT SERPL-MCNC: 1.16 MG/DL
EOSINOPHIL # BLD AUTO: 0.16 K/UL
EOSINOPHIL NFR BLD AUTO: 2.4 %
ESTIMATED AVERAGE GLUCOSE: 120 MG/DL
GLUCOSE SERPL-MCNC: 91 MG/DL
HBA1C MFR BLD HPLC: 5.8 %
HCT VFR BLD CALC: 46.9 %
HDLC SERPL-MCNC: 39 MG/DL
HGB BLD-MCNC: 15.5 G/DL
IMM GRANULOCYTES NFR BLD AUTO: 0.7 %
LDLC SERPL CALC-MCNC: 97 MG/DL
LYMPHOCYTES # BLD AUTO: 1.97 K/UL
LYMPHOCYTES NFR BLD AUTO: 29.5 %
MAN DIFF?: NORMAL
MCHC RBC-ENTMCNC: 32.4 PG
MCHC RBC-ENTMCNC: 33 GM/DL
MCV RBC AUTO: 97.9 FL
MONOCYTES # BLD AUTO: 0.62 K/UL
MONOCYTES NFR BLD AUTO: 9.3 %
NEUTROPHILS # BLD AUTO: 3.83 K/UL
NEUTROPHILS NFR BLD AUTO: 57.5 %
NONHDLC SERPL-MCNC: 139 MG/DL
NT-PROBNP SERPL-MCNC: 380 PG/ML
PLATELET # BLD AUTO: 216 K/UL
POTASSIUM SERPL-SCNC: 5.2 MMOL/L
PROT SERPL-MCNC: 7.3 G/DL
PSA SERPL-MCNC: 5.74 NG/ML
RBC # BLD: 4.79 M/UL
RBC # FLD: 13 %
SODIUM SERPL-SCNC: 140 MMOL/L
TRIGL SERPL-MCNC: 213 MG/DL
TSH SERPL-ACNC: 1.16 UIU/ML
URATE SERPL-MCNC: 4.4 MG/DL
WBC # FLD AUTO: 6.67 K/UL

## 2021-08-05 RX ORDER — PRIMIDONE 50 MG/1
50 TABLET ORAL DAILY
Refills: 0 | Status: ACTIVE | COMMUNITY
Start: 2021-08-05

## 2021-09-14 ENCOUNTER — APPOINTMENT (OUTPATIENT)
Dept: CARDIOLOGY | Facility: CLINIC | Age: 76
End: 2021-09-14
Payer: MEDICARE

## 2021-09-14 PROCEDURE — 93325 DOPPLER ECHO COLOR FLOW MAPG: CPT

## 2021-09-14 PROCEDURE — 93320 DOPPLER ECHO COMPLETE: CPT

## 2021-09-14 PROCEDURE — 93351 STRESS TTE COMPLETE: CPT

## 2021-09-14 NOTE — HISTORY OF PRESENT ILLNESS
[FreeTextEntry1] : Patient with multiple risk factors for coronary disease. While at [place] in 1997 had chest pain and an evolving acute anterior wall MI. At [facility] he was felt not to be a candidate for TPA and after an abnormal dobutamine echo on day 3 or 4 he was transferred to [facility]. On July 26, 1997 he had a catheterization which showed LVEF 35% with apical thrombus, virtually occluded LAD, normal circumflex artery, and 60-70% proximal RCA. After 3 days of IV heparin he had repeat catheterization which showed resolution of the thrombus. At that point he had a stent placed in his LAD which now had a 95% occlusion. The RCA was left alone. In December of 1997 there had obviously been some restenosis and he had a stent placed in his proximal LAD which had restenosed to 99% after the first septal branch, and a stent was placed in the proximal RCA which was now 75% narrowed. The patient did well after that although his LVEF at the time was only 30%. I began following the patient after his second angioplasty I have treated him medically since then. He has never required repeat catheterization, hospitalization for cardiac reasons, and has never developed congestive heart failure. His most recent stress tests include a stress echo in 2008 and a stress thallium in 2010. His left ventricular ejection fraction is now greater than 50% and he has no areas of ischemia although he still has a large area of akinesis in the mid septum and the anterior septum and apex. He has done a good job on risk factor reduction in terms of compliance with medical regimen but has not been active with exercise and weight loss. He runs borderline low blood pressures. He has the metabolic syndrome with low HDL and high triglycerides.  He denies chest pain, shortness of breath, or any arrhythmias.\par July 22, 2013. He was last here in November of 2012. At that time he did have a mild tremor of his right arm and his head. He saw Dr. Dariel Barreto of neurology in February who felt that the essential tremor was marked by nerves and did not require medications at this time. He wanted some thyroid function tests and he recommended the patient drink a little red wine. Patient is here today for followup. He Is not exercising only once in his diet but the wife keeps a monitored diet is here today his weight is the same as it was on his last visit.\par March 14, 2014. Patient returns today for followup. He had gained 4 pounds and his diet was not quite so good. He has some symptoms of BPH. He agreed to do a stress echo at his next visit.\par August 12, 2014. The patient is here for followup as well as stress echocardiogram. He denies any symptoms. ( he was a little upset that we were running late.) No interval change in his medications or his regimen. His daughter is due within this coming week in [place]. He was able to exercise for 9 minutes and there was no real change in the echocardiogram\par June 22, 2015. The patient is here for followup. No interval medical issues. No chest pain or shortness of breath. Played tennis the other day but does not really exercise on a regular basis. His wife thinks his tremor may be getting worse and involving his head as well although it doesn't bother the patient enough yet to go back to the neurologist.\par December 14, 2015. Patient is here for followup. No real change his symptoms. Because he moved, he has more up and down walking and he has managed to lose 5 pounds. Denies exertional shortness of breath or chest pain and still plays tennis but now rarely. No syncope or near syncope despite his bradycardia and borderline blood pressure. Only complaint is that his essential tremor seems to be getting worse. I initially added Zetia because LDL was above 70 but the cost was prohibitive for the patient.\par September 19, 2016. Patient is here in followup. He mostly spends time in [place] and has moved to [place]. He is hoping to eventually permanently move to [place], but his wife is still a little resistant. His activity level has decreased and his weight has gone up. He was able to play tennis 3 times for about an hour.  He is New York Heart Association class 2-3 at this point. No chest pressure, tightness, or heaviness. He saw neurology for intentional tremor and has a followup appointment tomorrow. He also went for a sleep study and has very mild sleep apnea and refuses to use the CPAP machine.\par October 20, 2017. First visit in a year. He had switched to Dr. Meneses for internal medicine, and because Dr. Meneses is also a cardiologist, he was seeing him exclusively initially, but now is back here. He claims his labs were okay also with Dr. Mik Alvarenga, whom he has been seeing for gout and is now on allopurinol and colchicine. No change in exercise tolerance or capacity. Weight is actually down 9 pounds from last year. EKG and exam are the same.\par October 30, 2018. First visit in a year once again. Absolutely no medical issues over the past 12 months. The only change is Dr. Meneses added 1 g of fish oil for triglycerides about 6-8 weeks ago and in Dr. Alvarenga increased allopurinol to 300 mg daily, and Colchrys every other day for his gout. Plan one tennis now than before because he is in [place] for half a year. Has lost weight and has absolutely no symptoms. Discussed the pros and cons of stress testing at this point, but we'll probably hold off.\par September 10, 2019.  Patient here in follow-up, this time came with his wife.  No new symptoms.  Still playing tennis a few days a week.  Gained back the 6 pounds he lost but otherwise no complaints.  His allopurinol is up to 300 mg and he is on colchicine.  Labs from Dr. Mik Alvarenga dated September 4 included a normal BMP and a normal CBC.  His uric acid was down to 4.6.  His CRP is 1.6 and his vitamin B12 is 407.  Lipids and a BNP were not done.  No chest pain, shortness of breath, syncope or near syncope.  EKG is sinus bradycardia at 56 with a first-degree block and is unchanged.  He also follows up with Dr. Alanis Meneses as internal medicine. Tremor is improved..\par July 23, 2020.  Patient here in follow-up.  Had his annual wellness visit with Dr. Castro on December 30.  His triglycerides was 440 and he recommended fenofibrate plus changing the simvastatin to atorvastatin.\par Still playing tennis twice a week except for the first month of COVID.  Back from [place] for 1 month now.  No symptoms of COVID.  Essential tremor may be a little worse and having some issues with his memory.  Had a previous work-up with Dr. Orlin Freeman of neurology.  Here for labs, (only had half a plum this morning)\par August 4, 2021.  First follow-up in 1 year.  Saw Dr. Meneses in December.  No complaints and ECG unchanged.  Had labs with cholesterol 181, HDL 41, , triglycerides 139.(First time LDL above 100 since 2015).  The labs okay but no thyroid function checked.  EKG today is sinus rhythm at 54 with a first-degree AV block is 0.28 and persisting QS in V2-4 and overall poor R wave progression.  His internist Dr. Alanis Meneses retired.  Also his wife may be retiring and they have for they may be spending much more time in [place] and less time up here so he wanted the name of cardiologist in [place].  He still plays tennis 3 times a week and no change overall but his weight did go up\par September 14, 2021.Patient return for stress echocardiogram.  Exercised for 7 minutes to a heart rate of 108 and a blood pressure of 158/66.  Fatigue and shortness of breath but no chest pain.  Did have 1 mm of horizontal ST depressions in leads II, III, aVF, V4 through 6 and this persisted for 6 minutes of recovery.  No echocardiographic evidence of ischemia although it is difficult with his fixed wall motion abnormality from his previous infarct.  Compared with his previous stress echo from a number of years ago he did not have these ST changes.  Long discussion with the patient given no symptoms and no evidence of ischemia on the echo however I feel we still need to rule out possible ischemia.  I offered to have him come back for a nuclear stress test or a CT angiogram and because he will be going back to [place] and only coming up for a brief period of time in October he will schedule the CT angiogram when he returns in October.  I advised him to restrict his activity somewhat until this is done. \par \par

## 2021-09-14 NOTE — PHYSICAL EXAM
[General Appearance - Well Developed] : well developed [Normal Appearance] : normal appearance [Well Groomed] : well groomed [General Appearance - Well Nourished] : well nourished [No Deformities] : no deformities [General Appearance - In No Acute Distress] : no acute distress [Normal Conjunctiva] : the conjunctiva exhibited no abnormalities [Eyelids - No Xanthelasma] : the eyelids demonstrated no xanthelasmas [Normal Oral Mucosa] : normal oral mucosa [No Oral Pallor] : no oral pallor [No Oral Cyanosis] : no oral cyanosis [Normal Jugular Venous A Waves Present] : normal jugular venous A waves present [Normal Jugular Venous V Waves Present] : normal jugular venous V waves present [No Jugular Venous Bean A Waves] : no jugular venous bean A waves [Respiration, Rhythm And Depth] : normal respiratory rhythm and effort [Exaggerated Use Of Accessory Muscles For Inspiration] : no accessory muscle use [Auscultation Breath Sounds / Voice Sounds] : lungs were clear to auscultation bilaterally [Heart Rate And Rhythm] : heart rate and rhythm were normal [Heart Sounds] : normal S1 and S2 [Murmurs] : no murmurs present [Abdomen Soft] : soft [Abdomen Tenderness] : non-tender [Abdomen Mass (___ Cm)] : no abdominal mass palpated [Abnormal Walk] : normal gait [Gait - Sufficient For Exercise Testing] : the gait was sufficient for exercise testing [Nail Clubbing] : no clubbing of the fingernails [Cyanosis, Localized] : no localized cyanosis [Petechial Hemorrhages (___cm)] : no petechial hemorrhages [Skin Color & Pigmentation] : normal skin color and pigmentation [] : no rash [No Venous Stasis] : no venous stasis [Skin Lesions] : no skin lesions [No Skin Ulcers] : no skin ulcer [No Xanthoma] : no  xanthoma was observed [Oriented To Time, Place, And Person] : oriented to person, place, and time [Affect] : the affect was normal [Mood] : the mood was normal [No Anxiety] : not feeling anxious [FreeTextEntry1] : No edema. Pedal pulses intact at 1+. No cogwheeling.  Does have a slight resting tremor of the right upper extremity

## 2021-10-11 ENCOUNTER — OUTPATIENT (OUTPATIENT)
Dept: OUTPATIENT SERVICES | Facility: HOSPITAL | Age: 76
LOS: 1 days | End: 2021-10-11

## 2021-10-11 ENCOUNTER — APPOINTMENT (OUTPATIENT)
Dept: CT IMAGING | Facility: CLINIC | Age: 76
End: 2021-10-11
Payer: MEDICARE

## 2021-10-11 PROCEDURE — 75574 CT ANGIO HRT W/3D IMAGE: CPT | Mod: 26

## 2021-10-12 ENCOUNTER — NON-APPOINTMENT (OUTPATIENT)
Age: 76
End: 2021-10-12

## 2021-12-22 RX ORDER — ATORVASTATIN CALCIUM 80 MG/1
80 TABLET, FILM COATED ORAL
Qty: 90 | Refills: 0 | Status: ACTIVE | COMMUNITY
Start: 2019-12-30 | End: 1900-01-01

## 2022-06-15 ENCOUNTER — NON-APPOINTMENT (OUTPATIENT)
Age: 77
End: 2022-06-15

## 2022-06-15 ENCOUNTER — APPOINTMENT (OUTPATIENT)
Dept: CARDIOLOGY | Facility: CLINIC | Age: 77
End: 2022-06-15
Payer: MEDICARE

## 2022-06-15 VITALS
RESPIRATION RATE: 17 BRPM | WEIGHT: 198 LBS | BODY MASS INDEX: 31.82 KG/M2 | OXYGEN SATURATION: 98 % | DIASTOLIC BLOOD PRESSURE: 64 MMHG | HEIGHT: 66 IN | HEART RATE: 59 BPM | SYSTOLIC BLOOD PRESSURE: 100 MMHG

## 2022-06-15 DIAGNOSIS — E78.1 PURE HYPERGLYCERIDEMIA: ICD-10-CM

## 2022-06-15 PROCEDURE — 99215 OFFICE O/P EST HI 40 MIN: CPT

## 2022-06-15 PROCEDURE — 36415 COLL VENOUS BLD VENIPUNCTURE: CPT

## 2022-06-15 PROCEDURE — 93000 ELECTROCARDIOGRAM COMPLETE: CPT

## 2022-06-15 RX ORDER — TOPIRAMATE 25 MG/1
25 TABLET, FILM COATED ORAL TWICE DAILY
Refills: 0 | Status: ACTIVE | COMMUNITY
Start: 2022-06-15

## 2022-06-15 NOTE — HISTORY OF PRESENT ILLNESS
[FreeTextEntry1] : Patient with multiple risk factors for coronary disease. While at [facility] in 1997 had chest pain and an evolving acute anterior wall MI. At [facility] he was felt not to be a candidate for TPA and after an abnormal dobutamine echo on day 3 or 4 he was transferred to [facility]. On July 26, 1997 he had a catheterization which showed LVEF 35% with apical thrombus, virtually occluded LAD, normal circumflex artery, and 60-70% proximal RCA. After 3 days of IV heparin he had repeat catheterization which showed resolution of the thrombus. At that point he had a stent placed in his LAD which now had a 95% occlusion. The RCA was left alone. In December of 1997 there had obviously been some restenosis and he had a stent placed in his proximal LAD which had restenosed to 99% after the first septal branch, and a stent was placed in the proximal RCA which was now 75% narrowed. The patient did well after that although his LVEF at the time was only 30%. I began following the patient after his second angioplasty I have treated him medically since then. He has never required repeat catheterization, hospitalization for cardiac reasons, and has never developed congestive heart failure. His most recent stress tests include a stress echo in 2008 and a stress thallium in 2010. His left ventricular ejection fraction is now greater than 50% and he has no areas of ischemia although he still has a large area of akinesis in the mid septum and the anterior septum and apex. He has done a good job on risk factor reduction in terms of compliance with medical regimen but has not been active with exercise and weight loss. He runs borderline low blood pressures. He has the metabolic syndrome with low HDL and high triglycerides.  He denies chest pain, shortness of breath, or any arrhythmias.\par July 22, 2013. He was last here in November of 2012. At that time he did have a mild tremor of his right arm and his head. He saw Dr. Dariel Barreto of neurology in February who felt that the essential tremor was marked by nerves and did not require medications at this time. He wanted some thyroid function tests and he recommended the patient drink a little red wine. Patient is here today for followup. He Is not exercising only once in his diet but the wife keeps a monitored diet is here today his weight is the same as it was on his last visit.\par March 14, 2014. Patient returns today for followup. He had gained 4 pounds and his diet was not quite so good. He has some symptoms of BPH. He agreed to do a stress echo at his next visit.\par August 12, 2014. The patient is here for followup as well as stress echocardiogram. He denies any symptoms. ( he was a little upset that we were running late.) No interval change in his medications or his regimen. His daughter is due within this coming week in [place]. He was able to exercise for 9 minutes and there was no real change in the echocardiogram\par June 22, 2015. The patient is here for followup. No interval medical issues. No chest pain or shortness of breath. Played tennis the other day but does not really exercise on a regular basis. His wife thinks his tremor may be getting worse and involving his head as well although it doesn't bother the patient enough yet to go back to the neurologist.\par December 14, 2015. Patient is here for followup. No real change his symptoms. Because he moved, he has more up and down walking and he has managed to lose 5 pounds. Denies exertional shortness of breath or chest pain and still plays tennis but now rarely. No syncope or near syncope despite his bradycardia and borderline blood pressure. Only complaint is that his essential tremor seems to be getting worse. I initially added Zetia because LDL was above 70 but the cost was prohibitive for the patient.\par September 19, 2016. Patient is here in followup. He mostly spends time in [place] and has moved to [place]. He is hoping to eventually permanently move to [place], but his wife is still a little resistant. His activity level has decreased and his weight has gone up. He was able to play tennis 3 times for about an hour.  He is New York Heart Association class 2-3 at this point. No chest pressure, tightness, or heaviness. He saw neurology for intentional tremor and has a followup appointment tomorrow. He also went for a sleep study and has very mild sleep apnea and refuses to use the CPAP machine.\par October 20, 2017. First visit in a year. He had switched to Dr. Meneses for internal medicine, and because Dr. Meneses is also a cardiologist, he was seeing him exclusively initially, but now is back here. He claims his labs were okay also with Dr. Mik Alvarenga, whom he has been seeing for gout and is now on allopurinol and colchicine. No change in exercise tolerance or capacity. Weight is actually down 9 pounds from last year. EKG and exam are the same.\par October 30, 2018. First visit in a year once again. Absolutely no medical issues over the past 12 months. The only change is Dr. Meneses added 1 g of fish oil for triglycerides about 6-8 weeks ago and in Dr. Alvarenga increased allopurinol to 300 mg daily, and Colchrys every other day for his gout. Plan one tennis now than before because he is in [place] for half a year. Has lost weight and has absolutely no symptoms. Discussed the pros and cons of stress testing at this point, but we'll probably hold off.\par September 10, 2019.  Patient here in follow-up, this time came with his wife.  No new symptoms.  Still playing tennis a few days a week.  Gained back the 6 pounds he lost but otherwise no complaints.  His allopurinol is up to 300 mg and he is on colchicine.  Labs from Dr. Mik Alvarenga dated September 4 included a normal BMP and a normal CBC.  His uric acid was down to 4.6.  His CRP is 1.6 and his vitamin B12 is 407.  Lipids and a BNP were not done.  No chest pain, shortness of breath, syncope or near syncope.  EKG is sinus bradycardia at 56 with a first-degree block and is unchanged.  He also follows up with Dr. Alanis Meneses as internal medicine. Tremor is improved..\par July 23, 2020.  Patient here in follow-up.  Had his annual wellness visit with Dr. Castro on December 30.  His triglycerides was 440 and he recommended fenofibrate plus changing the simvastatin to atorvastatin.\par Still playing tennis twice a week except for the first month of COVID.  Back from [place] for 1 month now.  No symptoms of COVID.  Essential tremor may be a little worse and having some issues with his memory.  Had a previous work-up with Dr. Orlin Freeman of neurology.  Here for labs, (only had half a plum this morning)\par August 4, 2021.  First follow-up in 1 year.  Saw Dr. Meneses in December.  No complaints and ECG unchanged.  Had labs with cholesterol 181, HDL 41, , triglycerides 139.(First time LDL above 100 since 2015).  The labs okay but no thyroid function checked.  EKG today is sinus rhythm at 54 with a first-degree AV block is 0.28 and persisting QS in V2-4 and overall poor R wave progression.  His internist Dr. Alanis Meneses retired.  Also his wife may be retiring and they have for they may be spending much more time in [place] and less time up here so he wanted the name of cardiologist in [place].  He still plays tennis 3 times a week and no change overall but his weight did go up\par September 14, 2021.Patient return for stress echocardiogram.  Exercised for 7 minutes to a heart rate of 108 and a blood pressure of 158/66.  Fatigue and shortness of breath but no chest pain.  Did have 1 mm of horizontal ST depressions in leads II, III, aVF, V4 through 6 and this persisted for 6 minutes of recovery.  No echocardiographic evidence of ischemia although it is difficult with his fixed wall motion abnormality from his previous infarct.  Compared with his previous stress echo from a number of years ago he did not have these ST changes.  Long discussion with the patient given no symptoms and no evidence of ischemia on the echo however I feel we still need to rule out possible ischemia.  I offered to have him come back for a nuclear stress test or a CT angiogram and because he will be going back to [place] and only coming up for a brief period of time in October he will schedule the CT angiogram when he returns in October.  I advised him to restrict his activity somewhat until this is done. \par October 12, 2021.  Had CT angio of his coronary arteries.  Patent LAD stent.  Beyond the stent moderate stenosis due to calcific and noncalcific plaque.  Dense calcification and motion artifact so could not evaluate proximal circumflex.  Similar for distal RCA.  LVEF 30%.\par Reviewed CTA with the patient.  Suboptimal study but stent in LAD is patent.  Possibly some degree of narrowing distal to the stent.  Other arteries are either not well seen or when seen no severe obstruction.  Only concern is LVEF calculated at 30% but it was a suboptimal study.  Patient is stable clinically and therefore no change in medications at this time.  We will follow up when he returns from [place] \par Liseth 15, 2022.  First return from [place].  EKG today with sinus bradycardia at 56, first-degree AV block of 0.29, QS V1 to 4 unchanged along with T wave inversion in aVL.  Only new issue is essential tremor mostly right hand occasionally head and has just started on Topamax 25 mg daily will be going up to 25 twice a day soon.  Absolutely no symptoms of CHF or angina, no palpitations syncope or near syncope.  No other change in medication and no COVID issues while in [place].  Needs clearance for colonoscopy which will be in October.\par

## 2022-06-15 NOTE — CARDIOLOGY SUMMARY
[No Ischemia] : no Ischemia [Ant Wall Defect] : anterior wall defect [Fixed Defect] : fixed defect [___] : [unfilled] [LVEF ___%] : LVEF [unfilled]% [Moderate] : moderate LV dysfunction [Enlarged] : enlarged LA size [None] : no mitral regurgitation

## 2022-06-15 NOTE — PHYSICAL EXAM
[General Appearance - Well Developed] : well developed [Normal Appearance] : normal appearance [Well Groomed] : well groomed [General Appearance - Well Nourished] : well nourished [No Deformities] : no deformities [General Appearance - In No Acute Distress] : no acute distress [Normal Conjunctiva] : the conjunctiva exhibited no abnormalities [Eyelids - No Xanthelasma] : the eyelids demonstrated no xanthelasmas [Normal Oral Mucosa] : normal oral mucosa [No Oral Pallor] : no oral pallor [No Oral Cyanosis] : no oral cyanosis [Normal Jugular Venous A Waves Present] : normal jugular venous A waves present [Normal Jugular Venous V Waves Present] : normal jugular venous V waves present [No Jugular Venous Bean A Waves] : no jugular venous bean A waves [Respiration, Rhythm And Depth] : normal respiratory rhythm and effort [Exaggerated Use Of Accessory Muscles For Inspiration] : no accessory muscle use [Auscultation Breath Sounds / Voice Sounds] : lungs were clear to auscultation bilaterally [Heart Rate And Rhythm] : heart rate and rhythm were normal [Heart Sounds] : normal S1 and S2 [Murmurs] : no murmurs present [Abdomen Soft] : soft [Abdomen Tenderness] : non-tender [Abdomen Mass (___ Cm)] : no abdominal mass palpated [Abnormal Walk] : normal gait [Gait - Sufficient For Exercise Testing] : the gait was sufficient for exercise testing [Nail Clubbing] : no clubbing of the fingernails [Cyanosis, Localized] : no localized cyanosis [Petechial Hemorrhages (___cm)] : no petechial hemorrhages [Skin Color & Pigmentation] : normal skin color and pigmentation [] : no rash [No Venous Stasis] : no venous stasis [Skin Lesions] : no skin lesions [No Skin Ulcers] : no skin ulcer [No Xanthoma] : no  xanthoma was observed [Oriented To Time, Place, And Person] : oriented to person, place, and time [Affect] : the affect was normal [Mood] : the mood was normal [No Anxiety] : not feeling anxious [FreeTextEntry1] : No edema. Pedal pulses intact at 1+. No cogwheeling.  Does have a slight resting tremor of the right upper extremity, worse with activity.

## 2022-06-15 NOTE — REVIEW OF SYSTEMS
[Negative] : Heme/Lymph [Tremor] : a tremor was seen [Weight Gain (___ Lbs)] : no recent weight gain

## 2022-06-16 DIAGNOSIS — R79.89 OTHER SPECIFIED ABNORMAL FINDINGS OF BLOOD CHEMISTRY: ICD-10-CM

## 2022-06-16 LAB
ALBUMIN SERPL ELPH-MCNC: 4.8 G/DL
ALP BLD-CCNC: 56 U/L
ALT SERPL-CCNC: 19 U/L
ANION GAP SERPL CALC-SCNC: 12 MMOL/L
AST SERPL-CCNC: 21 U/L
BASOPHILS # BLD AUTO: 0.05 K/UL
BASOPHILS NFR BLD AUTO: 0.7 %
BILIRUB SERPL-MCNC: 0.4 MG/DL
BUN SERPL-MCNC: 25 MG/DL
CALCIUM SERPL-MCNC: 9.9 MG/DL
CHLORIDE SERPL-SCNC: 103 MMOL/L
CHOLEST SERPL-MCNC: 159 MG/DL
CK SERPL-CCNC: 176 U/L
CO2 SERPL-SCNC: 21 MMOL/L
CREAT SERPL-MCNC: 1.64 MG/DL
EGFR: 44 ML/MIN/1.73M2
EOSINOPHIL # BLD AUTO: 0.24 K/UL
EOSINOPHIL NFR BLD AUTO: 3.5 %
ESTIMATED AVERAGE GLUCOSE: 128 MG/DL
GLUCOSE SERPL-MCNC: 116 MG/DL
HBA1C MFR BLD HPLC: 6.1 %
HCT VFR BLD CALC: 44 %
HDLC SERPL-MCNC: 37 MG/DL
HGB BLD-MCNC: 14.4 G/DL
IMM GRANULOCYTES NFR BLD AUTO: 1.2 %
LDLC SERPL CALC-MCNC: 92 MG/DL
LYMPHOCYTES # BLD AUTO: 1.83 K/UL
LYMPHOCYTES NFR BLD AUTO: 26.7 %
MAN DIFF?: NORMAL
MCHC RBC-ENTMCNC: 31.6 PG
MCHC RBC-ENTMCNC: 32.7 GM/DL
MCV RBC AUTO: 96.5 FL
MONOCYTES # BLD AUTO: 0.61 K/UL
MONOCYTES NFR BLD AUTO: 8.9 %
NEUTROPHILS # BLD AUTO: 4.05 K/UL
NEUTROPHILS NFR BLD AUTO: 59 %
NONHDLC SERPL-MCNC: 121 MG/DL
NT-PROBNP SERPL-MCNC: 327 PG/ML
PLATELET # BLD AUTO: 206 K/UL
POTASSIUM SERPL-SCNC: 5.2 MMOL/L
PROT SERPL-MCNC: 7.2 G/DL
PSA SERPL-MCNC: 8.22 NG/ML
RBC # BLD: 4.56 M/UL
RBC # FLD: 13.2 %
SODIUM SERPL-SCNC: 136 MMOL/L
TRIGL SERPL-MCNC: 148 MG/DL
URATE SERPL-MCNC: 3.7 MG/DL
WBC # FLD AUTO: 6.86 K/UL

## 2022-07-06 ENCOUNTER — NON-APPOINTMENT (OUTPATIENT)
Age: 77
End: 2022-07-06

## 2023-07-20 ENCOUNTER — RX RENEWAL (OUTPATIENT)
Age: 78
End: 2023-07-20

## 2023-08-18 ENCOUNTER — APPOINTMENT (OUTPATIENT)
Dept: CARDIOLOGY | Facility: CLINIC | Age: 78
End: 2023-08-18
Payer: MEDICARE

## 2023-08-18 VITALS
BODY MASS INDEX: 32.14 KG/M2 | RESPIRATION RATE: 17 BRPM | OXYGEN SATURATION: 99 % | HEART RATE: 70 BPM | SYSTOLIC BLOOD PRESSURE: 104 MMHG | DIASTOLIC BLOOD PRESSURE: 67 MMHG | WEIGHT: 200 LBS | HEIGHT: 66 IN

## 2023-08-18 DIAGNOSIS — I50.22 CHRONIC SYSTOLIC (CONGESTIVE) HEART FAILURE: ICD-10-CM

## 2023-08-18 DIAGNOSIS — I25.10 ATHEROSCLEROTIC HEART DISEASE OF NATIVE CORONARY ARTERY W/OUT ANGINA PECTORIS: ICD-10-CM

## 2023-08-18 DIAGNOSIS — I10 ESSENTIAL (PRIMARY) HYPERTENSION: ICD-10-CM

## 2023-08-18 DIAGNOSIS — R97.20 ELEVATED PROSTATE, SPECIFIC ANTIGEN [PSA]: ICD-10-CM

## 2023-08-18 PROCEDURE — 99214 OFFICE O/P EST MOD 30 MIN: CPT

## 2023-08-18 PROCEDURE — 93000 ELECTROCARDIOGRAM COMPLETE: CPT

## 2023-08-18 NOTE — DISCUSSION/SUMMARY
[FreeTextEntry1] : Labs will be sent.  If no significant change and if no new symptoms will just follow-up 6 months when he returns from [place] next.  Currently no change in medication.  Patient asked to repeat PSA. [EKG obtained to assist in diagnosis and management of assessed problem(s)] : EKG obtained to assist in diagnosis and management of assessed problem(s)

## 2023-08-18 NOTE — ASSESSMENT
[FreeTextEntry1] : The patient continues to do well since his large anterior wall MI in 1997. His blood pressure usually runs low but he has absolutely no symptoms of lightheadedness or fatigue. His EKG shows sinus (vs. low atrial) at 56 with longer first degree AV block, with low voltage and signs of his anterior MI and is otherwise unchanged. He is very compliant with his meds and was doing better with weight loss and exercise as he is playing more tennis, etc. yet somehow he put on a few pounds.  Spends more than 6 months in [place] now so he claims when he is with his wife he eats more because she cooks when he is in [place] by himself he eats less.  Will probably be spending even more time in [place] now.  Not quite as active as he used to be.  In August, 2014 he was able to do 9 minutes on the treadmill without any symptoms, arrhythmias, or EKG changes but October, 2016 fatigued after 7 minutes; would probably be better now. The echocardiogram was unchanged showing his old anterior MI but the rest seemed within normal limits. 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 He has an essential tremor of his head and his hand, but no signs of Parkinsonism on followup with Neurology.  Still able to play tennis despite the tremor. (His last colonoscopy was more than 10 years ago and he claims he had no polyps and there is no family history of colon cancer so he should have a ten-year interval between colonoscopies. This was up to Dr. Meneses who retired in July, 2021 however.  Will probably use one of the other doctors in the group.) He is being treated for gout, by Dr. Mik Alvarenga, and his allopurinol has been increased to 300 mg along with his Colchrys 0.6 mg daily.  We will check labs including a CK for the combination of atorvastatin and fenofibrate.  Last stress echo 5 years ago so we repeated it and the EKG part seemed more abnormal although the rest was unchanged.  CTA was done as mentioned above and was fairly suboptimal unfortunately.  LVEF calculated at 30% but patient has been totally asymptomatic with no change in exercise capacity or shortness of breath and never had any incidence of arrhythmias.  For now we are continuing his current medical regimen.  If labs are okay he should be okay for colonoscopy but will have to change the clopidogrel to Plavix for aspirin 5 days before   Patient returns August 18, 2023.  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  No exertional shortness of breath or chest pain.  No palpitations, syncope or near syncope.  Cardiac and physical exam unchanged.  EKG unchanged, still sinus rhythm with a marked first-degree AV block.(The first-degree block has prolonged more).  Only on 5 mg of bisoprolol but this is probably helping his intention tremor as well.  No symptoms of dizziness syncope or near syncope.  Still fairly active including playing doubles tennis in [place].

## 2023-08-18 NOTE — REVIEW OF SYSTEMS
[Tremor] : a tremor was seen [Negative] : Heme/Lymph [Weight Gain (___ Lbs)] : [unfilled] ~Ulb weight gain [FreeTextEntry4] : Moh's surgery right ear [de-identified] : Mohs surgery for melanoma right ear [de-identified] : Bilateral intention tremor

## 2023-08-18 NOTE — REASON FOR VISIT
[facility], [place], Emergency Department  [address]  [place] [address]  Phone:  [phone]  Fax:  [phone]                                    Anuj Tam   MRN: [id number]    Department:  [facility], Emergency Department   Date of Visit:  12/24/2019           After Visit Summary Signature Page    I have received my discharge instructions, and my questions have been answered. I have discussed any challenges I see with this plan with the nurse or doctor.    ..........................................................................................................................................  Patient/Patient Representative Signature      ..........................................................................................................................................  Patient Representative Print Name and Relationship to Patient    ..................................................               ................................................  Date                                   Time    ..........................................................................................................................................  Reviewed by Signature/Title    ...................................................              ..............................................  Date                                               Time          22EPIC Rev 08/18        [FreeTextEntry1] : Followup for patient with remote MI and stent

## 2023-08-18 NOTE — HISTORY OF PRESENT ILLNESS
[FreeTextEntry1] : Patient with multiple risk factors for coronary disease. While at [facility] in 1997 had chest pain and an evolving acute anterior wall MI. At [facility] he was felt not to be a candidate for TPA and after an abnormal dobutamine echo on day 3 or 4 he was transferred to [facility]. On July 26, 1997 he had a catheterization which showed LVEF 35% with apical thrombus, virtually occluded LAD, normal circumflex artery, and 60-70% proximal RCA. After 3 days of IV heparin he had repeat catheterization which showed resolution of the thrombus. At that point he had a stent placed in his LAD which now had a 95% occlusion. The RCA was left alone. In December of 1997 there had obviously been some restenosis and he had a stent placed in his proximal LAD which had restenosed to 99% after the first septal branch, and a stent was placed in the proximal RCA which was now 75% narrowed. The patient did well after that although his LVEF at the time was only 30%. I began following the patient after his second angioplasty I have treated him medically since then. He has never required repeat catheterization, hospitalization for cardiac reasons, and has never developed congestive heart failure. His most recent stress tests include a stress echo in 2008 and a stress thallium in 2010. His left ventricular ejection fraction is now greater than 50% and he has no areas of ischemia although he still has a large area of akinesis in the mid septum and the anterior septum and apex. He has done a good job on risk factor reduction in terms of compliance with medical regimen but has not been active with exercise and weight loss. He runs borderline low blood pressures. He has the metabolic syndrome with low HDL and high triglycerides.  He denies chest pain, shortness of breath, or any arrhythmias. July 22, 2013. He was last here in November of 2012. At that time he did have a mild tremor of his right arm and his head. He saw Dr. Dariel Barreto of neurology in February who felt that the essential tremor was marked by nerves and did not require medications at this time. He wanted some thyroid function tests and he recommended the patient drink a little red wine. Patient is here today for followup. He Is not exercising only once in his diet but the wife keeps a monitored diet is here today his weight is the same as it was on his last visit. March 14, 2014. Patient returns today for followup. He had gained 4 pounds and his diet was not quite so good. He has some symptoms of BPH. He agreed to do a stress echo at his next visit. August 12, 2014. The patient is here for followup as well as stress echocardiogram. He denies any symptoms. ( he was a little upset that we were running late.) No interval change in his medications or his regimen. His daughter is due within this coming week in [place]. He was able to exercise for 9 minutes and there was no real change in the echocardiogram June 22, 2015. The patient is here for followup. No interval medical issues. No chest pain or shortness of breath. Played tennis the other day but does not really exercise on a regular basis. His wife thinks his tremor may be getting worse and involving his head as well although it doesn't bother the patient enough yet to go back to the neurologist. December 14, 2015. Patient is here for followup. No real change his symptoms. Because he moved, he has more up and down walking and he has managed to lose 5 pounds. Denies exertional shortness of breath or chest pain and still plays tennis but now rarely. No syncope or near syncope despite his bradycardia and borderline blood pressure. Only complaint is that his essential tremor seems to be getting worse. I initially added Zetia because LDL was above 70 but the cost was prohibitive for the patient. September 19, 2016. Patient is here in followup. He mostly spends time in [place] and has moved to [place]. He is hoping to eventually permanently move to [place], but his wife is still a little resistant. His activity level has decreased and his weight has gone up. He was able to play tennis 3 times for about an hour.  He is New York Heart Association class 2-3 at this point. No chest pressure, tightness, or heaviness. He saw neurology for intentional tremor and has a followup appointment tomorrow. He also went for a sleep study and has very mild sleep apnea and refuses to use the CPAP machine. October 20, 2017. First visit in a year. He had switched to Dr. Meneses for internal medicine, and because Dr. Meneses is also a cardiologist, he was seeing him exclusively initially, but now is back here. He claims his labs were okay also with Dr. Mik Alvarenga, whom he has been seeing for gout and is now on allopurinol and colchicine. No change in exercise tolerance or capacity. Weight is actually down 9 pounds from last year. EKG and exam are the same. October 30, 2018. First visit in a year once again. Absolutely no medical issues over the past 12 months. The only change is Dr. Meneses added 1 g of fish oil for triglycerides about 6-8 weeks ago and in Dr. Alvarenga increased allopurinol to 300 mg daily, and Colchrys every other day for his gout. Plan one tennis now than before because he is in [place] for half a year. Has lost weight and has absolutely no symptoms. Discussed the pros and cons of stress testing at this point, but we'll probably hold off. September 10, 2019.  Patient here in follow-up, this time came with his wife.  No new symptoms.  Still playing tennis a few days a week.  Gained back the 6 pounds he lost but otherwise no complaints.  His allopurinol is up to 300 mg and he is on colchicine.  Labs from Dr. Mik Alvarenga dated September 4 included a normal BMP and a normal CBC.  His uric acid was down to 4.6.  His CRP is 1.6 and his vitamin B12 is 407.  Lipids and a BNP were not done.  No chest pain, shortness of breath, syncope or near syncope.  EKG is sinus bradycardia at 56 with a first-degree block and is unchanged.  He also follows up with Dr. Alanis Meneses as internal medicine. Tremor is improved.. July 23, 2020.  Patient here in follow-up.  Had his annual wellness visit with Dr. Castro on December 30.  His triglycerides was 440 and he recommended fenofibrate plus changing the simvastatin to atorvastatin. Still playing tennis twice a week except for the first month of COVID.  Back from [place] for 1 month now.  No symptoms of COVID.  Essential tremor may be a little worse and having some issues with his memory.  Had a previous work-up with Dr. Orlin Freeman of neurology.  Here for labs, (only had half a plum this morning) August 4, 2021.  First follow-up in 1 year.  Saw Dr. Meneses in December.  No complaints and ECG unchanged.  Had labs with cholesterol 181, HDL 41, , triglycerides 139.(First time LDL above 100 since 2015).  The labs okay but no thyroid function checked.  EKG today is sinus rhythm at 54 with a first-degree AV block is 0.28 and persisting QS in V2-4 and overall poor R wave progression.  His internist Dr. Alanis Meneses retired.  Also his wife may be retiring and they have for they may be spending much more time in [place] and less time up here so he wanted the name of cardiologist in [place].  He still plays tennis 3 times a week and no change overall but his weight did go up September 14, 2021.Patient return for stress echocardiogram.  Exercised for 7 minutes to a heart rate of 108 and a blood pressure of 158/66.  Fatigue and shortness of breath but no chest pain.  Did have 1 mm of horizontal ST depressions in leads II, III, aVF, V4 through 6 and this persisted for 6 minutes of recovery.  No echocardiographic evidence of ischemia although it is difficult with his fixed wall motion abnormality from his previous infarct.  Compared with his previous stress echo from a number of years ago he did not have these ST changes.  Long discussion with the patient given no symptoms and no evidence of ischemia on the echo however I feel we still need to rule out possible ischemia.  I offered to have him come back for a nuclear stress test or a CT angiogram and because he will be going back to [place] and only coming up for a brief period of time in October he will schedule the CT angiogram when he returns in October.  I advised him to restrict his activity somewhat until this is done.  October 12, 2021.  Had CT angio of his coronary arteries.  Patent LAD stent.  Beyond the stent moderate stenosis due to calcific and noncalcific plaque.  Dense calcification and motion artifact so could not evaluate proximal circumflex.  Similar for distal RCA.  LVEF 30%. Reviewed CTA with the patient.  Suboptimal study but stent in LAD is patent.  Possibly some degree of narrowing distal to the stent.  Other arteries are either not well seen or when seen no severe obstruction.  Only concern is LVEF calculated at 30% but it was a suboptimal study.  Patient is stable clinically and therefore no change in medications at this time.  We will follow up when he returns from [place]  Liseth 15, 2022.  First return from [place].  EKG today with sinus bradycardia at 56, first-degree AV block of 0.29, QS V1 to 4 unchanged along with T wave inversion in aVL.  Only new issue is essential tremor mostly right hand occasionally head and has just started on Topamax 25 mg daily will be going up to 25 twice a day soon.  Absolutely no symptoms of CHF or angina, no palpitations syncope or near syncope.  No other change in medication and no COVID issues while in [place].  Needs clearance for colonoscopy which will be in October. August 18, 2023.  First visit in over 1 year.  Was here for 3 months and is going back to [place] in about a week.  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  No issues with heart failure chest pain etc.  Denies any change in his exercise capacity.  Still plays tennis when he is down in [place] but has not played now for the 3 months he is up here.  Weight did go up a little.  No COVID issues etc.  EKG is sinus rhythm at 66, marked first-degree AV block of 0.3-4.  Poor R wave progression and low voltage in the limb leads unchanged.  Last stress echo was 2021.

## 2023-08-19 ENCOUNTER — NON-APPOINTMENT (OUTPATIENT)
Age: 78
End: 2023-08-19

## 2023-08-21 LAB
ALBUMIN SERPL ELPH-MCNC: 4.5 G/DL
ALP BLD-CCNC: 63 U/L
ALT SERPL-CCNC: 24 U/L
ANION GAP SERPL CALC-SCNC: 15 MMOL/L
AST SERPL-CCNC: 21 U/L
BILIRUB SERPL-MCNC: 0.6 MG/DL
BUN SERPL-MCNC: 22 MG/DL
CALCIUM SERPL-MCNC: 9.7 MG/DL
CHLORIDE SERPL-SCNC: 101 MMOL/L
CHOLEST SERPL-MCNC: 182 MG/DL
CK SERPL-CCNC: 133 U/L
CO2 SERPL-SCNC: 22 MMOL/L
CREAT SERPL-MCNC: 1.09 MG/DL
EGFR: 70 ML/MIN/1.73M2
ESTIMATED AVERAGE GLUCOSE: 134 MG/DL
GLUCOSE SERPL-MCNC: 184 MG/DL
HBA1C MFR BLD HPLC: 6.3 %
HDLC SERPL-MCNC: 37 MG/DL
LDLC SERPL CALC-MCNC: 98 MG/DL
NONHDLC SERPL-MCNC: 145 MG/DL
NT-PROBNP SERPL-MCNC: 330 PG/ML
POTASSIUM SERPL-SCNC: 4.7 MMOL/L
PROT SERPL-MCNC: 7 G/DL
PSA SERPL-MCNC: 2.55 NG/ML
SODIUM SERPL-SCNC: 138 MMOL/L
TRIGL SERPL-MCNC: 276 MG/DL
TSH SERPL-ACNC: 1.13 UIU/ML
URATE SERPL-MCNC: 4.9 MG/DL

## 2023-09-14 RX ORDER — FENOFIBRATE 134 MG/1
134 CAPSULE ORAL
Qty: 90 | Refills: 1 | Status: ACTIVE | COMMUNITY
Start: 2019-12-30 | End: 1900-01-01

## 2023-09-15 ENCOUNTER — RX RENEWAL (OUTPATIENT)
Age: 78
End: 2023-09-15

## 2023-12-09 ENCOUNTER — RX RENEWAL (OUTPATIENT)
Age: 78
End: 2023-12-09

## 2024-02-20 ENCOUNTER — RX RENEWAL (OUTPATIENT)
Age: 79
End: 2024-02-20

## 2024-02-23 DIAGNOSIS — G25.0 ESSENTIAL TREMOR: ICD-10-CM

## 2024-02-23 RX ORDER — PROPRANOLOL HYDROCHLORIDE 80 MG/1
80 CAPSULE, EXTENDED RELEASE ORAL
Qty: 90 | Refills: 1 | Status: ACTIVE | COMMUNITY
Start: 2024-02-23 | End: 1900-01-01

## 2024-04-04 ENCOUNTER — NON-APPOINTMENT (OUTPATIENT)
Age: 79
End: 2024-04-04

## 2024-04-18 ENCOUNTER — RX RENEWAL (OUTPATIENT)
Age: 79
End: 2024-04-18

## 2024-04-18 RX ORDER — EMPAGLIFLOZIN 10 MG/1
10 TABLET, FILM COATED ORAL
Qty: 90 | Refills: 1 | Status: ACTIVE | COMMUNITY
Start: 2023-08-21 | End: 1900-01-01

## 2024-07-03 ENCOUNTER — NON-APPOINTMENT (OUTPATIENT)
Age: 79
End: 2024-07-03

## 2024-07-08 ENCOUNTER — APPOINTMENT (OUTPATIENT)
Dept: OTOLARYNGOLOGY | Facility: CLINIC | Age: 79
End: 2024-07-08
Payer: MEDICARE

## 2024-07-08 ENCOUNTER — NON-APPOINTMENT (OUTPATIENT)
Age: 79
End: 2024-07-08

## 2024-07-08 VITALS
HEART RATE: 59 BPM | HEIGHT: 66 IN | TEMPERATURE: 98.3 F | DIASTOLIC BLOOD PRESSURE: 72 MMHG | SYSTOLIC BLOOD PRESSURE: 123 MMHG | BODY MASS INDEX: 30.53 KG/M2 | WEIGHT: 190 LBS

## 2024-07-08 PROCEDURE — 69210 REMOVE IMPACTED EAR WAX UNI: CPT

## 2024-07-08 PROCEDURE — 99204 OFFICE O/P NEW MOD 45 MIN: CPT | Mod: 25

## 2024-07-08 RX ORDER — NEOMYCIN SULFATE, POLYMYXIN B SULFATE AND HYDROCORTISONE 3.5; 10000; 1 MG/ML; [IU]/ML; MG/ML
3.5-10000-1 SOLUTION AURICULAR (OTIC) TWICE DAILY
Qty: 1 | Refills: 0 | Status: ACTIVE | COMMUNITY
Start: 2024-07-08 | End: 1900-01-01

## 2024-07-12 ENCOUNTER — APPOINTMENT (OUTPATIENT)
Dept: OTOLARYNGOLOGY | Facility: CLINIC | Age: 79
End: 2024-07-12
Payer: MEDICARE

## 2024-07-12 VITALS
DIASTOLIC BLOOD PRESSURE: 66 MMHG | HEART RATE: 59 BPM | HEIGHT: 66 IN | SYSTOLIC BLOOD PRESSURE: 115 MMHG | WEIGHT: 190 LBS | BODY MASS INDEX: 30.53 KG/M2

## 2024-07-12 DIAGNOSIS — H61.23 IMPACTED CERUMEN, BILATERAL: ICD-10-CM

## 2024-07-12 PROCEDURE — G2211 COMPLEX E/M VISIT ADD ON: CPT

## 2024-07-12 PROCEDURE — 99213 OFFICE O/P EST LOW 20 MIN: CPT

## 2024-07-12 RX ORDER — SULFAMETHOXAZOLE AND TRIMETHOPRIM 800; 160 MG/1; MG/1
800-160 TABLET ORAL
Qty: 14 | Refills: 0 | Status: ACTIVE | COMMUNITY
Start: 2024-02-05

## 2024-07-12 RX ORDER — OFLOXACIN OTIC 3 MG/ML
0.3 SOLUTION AURICULAR (OTIC)
Qty: 5 | Refills: 0 | Status: ACTIVE | COMMUNITY
Start: 2024-07-04

## 2024-07-18 ENCOUNTER — APPOINTMENT (OUTPATIENT)
Dept: OTOLARYNGOLOGY | Facility: CLINIC | Age: 79
End: 2024-07-18
Payer: MEDICARE

## 2024-07-18 VITALS — TEMPERATURE: 98 F | HEART RATE: 60 BPM | DIASTOLIC BLOOD PRESSURE: 71 MMHG | SYSTOLIC BLOOD PRESSURE: 110 MMHG

## 2024-07-18 DIAGNOSIS — H60.312 DIFFUSE OTITIS EXTERNA, LEFT EAR: ICD-10-CM

## 2024-07-18 DIAGNOSIS — H61.22 IMPACTED CERUMEN, LEFT EAR: ICD-10-CM

## 2024-07-18 DIAGNOSIS — H92.09 OTALGIA, UNSPECIFIED EAR: ICD-10-CM

## 2024-07-18 DIAGNOSIS — H90.3 SENSORINEURAL HEARING LOSS, BILATERAL: ICD-10-CM

## 2024-07-18 PROCEDURE — G0268 REMOVAL OF IMPACTED WAX MD: CPT | Mod: LT

## 2024-07-18 PROCEDURE — 99213 OFFICE O/P EST LOW 20 MIN: CPT | Mod: 25

## 2024-07-18 PROCEDURE — 92557 COMPREHENSIVE HEARING TEST: CPT

## 2024-07-18 PROCEDURE — 92567 TYMPANOMETRY: CPT

## 2024-07-22 ENCOUNTER — APPOINTMENT (OUTPATIENT)
Dept: CARDIOLOGY | Facility: CLINIC | Age: 79
End: 2024-07-22
Payer: MEDICARE

## 2024-07-22 ENCOUNTER — NON-APPOINTMENT (OUTPATIENT)
Age: 79
End: 2024-07-22

## 2024-07-22 VITALS
SYSTOLIC BLOOD PRESSURE: 126 MMHG | WEIGHT: 194 LBS | OXYGEN SATURATION: 97 % | RESPIRATION RATE: 17 BRPM | BODY MASS INDEX: 31.18 KG/M2 | DIASTOLIC BLOOD PRESSURE: 78 MMHG | HEIGHT: 66 IN | HEART RATE: 64 BPM

## 2024-07-22 DIAGNOSIS — I50.22 CHRONIC SYSTOLIC (CONGESTIVE) HEART FAILURE: ICD-10-CM

## 2024-07-22 DIAGNOSIS — I25.10 ATHEROSCLEROTIC HEART DISEASE OF NATIVE CORONARY ARTERY W/OUT ANGINA PECTORIS: ICD-10-CM

## 2024-07-22 DIAGNOSIS — I34.0 NONRHEUMATIC MITRAL (VALVE) INSUFFICIENCY: ICD-10-CM

## 2024-07-22 DIAGNOSIS — Z00.00 ENCOUNTER FOR GENERAL ADULT MEDICAL EXAMINATION W/OUT ABNORMAL FINDINGS: ICD-10-CM

## 2024-07-22 DIAGNOSIS — E78.1 PURE HYPERGLYCERIDEMIA: ICD-10-CM

## 2024-07-22 PROCEDURE — 99214 OFFICE O/P EST MOD 30 MIN: CPT

## 2024-07-22 PROCEDURE — G2211 COMPLEX E/M VISIT ADD ON: CPT

## 2024-07-22 PROCEDURE — 93000 ELECTROCARDIOGRAM COMPLETE: CPT

## 2024-07-22 RX ORDER — METHYLPREDNISOLONE 4 MG/1
4 TABLET ORAL
Qty: 1 | Refills: 1 | Status: DISCONTINUED | COMMUNITY
Start: 2024-07-08 | End: 2024-07-22

## 2024-07-22 RX ORDER — EZETIMIBE 10 MG/1
10 TABLET ORAL
Qty: 90 | Refills: 1 | Status: ACTIVE | COMMUNITY
Start: 2024-07-22

## 2024-07-22 RX ORDER — FINASTERIDE 5 MG/1
5 TABLET, FILM COATED ORAL
Qty: 90 | Refills: 0 | Status: ACTIVE | COMMUNITY
Start: 2024-05-17

## 2024-07-22 RX ORDER — TAMSULOSIN HYDROCHLORIDE 0.4 MG/1
0.4 CAPSULE ORAL
Qty: 180 | Refills: 2 | Status: ACTIVE | COMMUNITY
Start: 2024-07-22

## 2024-07-22 NOTE — HISTORY OF PRESENT ILLNESS
[FreeTextEntry1] : Patient with multiple risk factors for coronary disease. While at [place] [facility] in 1997 had chest pain and an evolving acute anterior wall MI. At [facility] he was felt not to be a candidate for TPA and after an abnormal dobutamine echo on day 3 or 4 he was transferred to [facility]. On July 26, 1997 he had a catheterization which showed LVEF 35% with apical thrombus, virtually occluded LAD, normal circumflex artery, and 60-70% proximal RCA. After 3 days of IV heparin he had repeat catheterization which showed resolution of the thrombus. At that point he had a stent placed in his LAD which now had a 95% occlusion. The RCA was left alone. In December of 1997 there had obviously been some restenosis and he had a stent placed in his proximal LAD which had restenosed to 99% after the first septal branch, and a stent was placed in the proximal RCA which was now 75% narrowed. The patient did well after that although his LVEF at the time was only 30%. I began following the patient after his second angioplasty I have treated him medically since then. He has never required repeat catheterization, hospitalization for cardiac reasons, and has never developed congestive heart failure. His most recent stress tests include a stress echo in 2008 and a stress thallium in 2010. His left ventricular ejection fraction is now greater than 50% and he has no areas of ischemia although he still has a large area of akinesis in the mid septum and the anterior septum and apex. He has done a good job on risk factor reduction in terms of compliance with medical regimen but has not been active with exercise and weight loss. He runs borderline low blood pressures. He has the metabolic syndrome with low HDL and high triglycerides.  He denies chest pain, shortness of breath, or any arrhythmias. July 22, 2013. He was last here in November of 2012. At that time he did have a mild tremor of his right arm and his head. He saw Dr. Dariel Barreto of neurology in February who felt that the essential tremor was marked by nerves and did not require medications at this time. He wanted some thyroid function tests and he recommended the patient drink a little red wine. Patient is here today for followup. He Is not exercising only once in his diet but the wife keeps a monitored diet is here today his weight is the same as it was on his last visit. March 14, 2014. Patient returns today for followup. He had gained 4 pounds and his diet was not quite so good. He has some symptoms of BPH. He agreed to do a stress echo at his next visit. August 12, 2014. The patient is here for followup as well as stress echocardiogram. He denies any symptoms. ( he was a little upset that we were running late.) No interval change in his medications or his regimen. His daughter is due within this coming week in [place]. He was able to exercise for 9 minutes and there was no real change in the echocardiogram June 22, 2015. The patient is here for followup. No interval medical issues. No chest pain or shortness of breath. Played tennis the other day but does not really exercise on a regular basis. His wife thinks his tremor may be getting worse and involving his head as well although it doesn't bother the patient enough yet to go back to the neurologist. December 14, 2015. Patient is here for followup. No real change his symptoms. Because he moved, he has more up and down walking and he has managed to lose 5 pounds. Denies exertional shortness of breath or chest pain and still plays tennis but now rarely. No syncope or near syncope despite his bradycardia and borderline blood pressure. Only complaint is that his essential tremor seems to be getting worse. I initially added Zetia because LDL was above 70 but the cost was prohibitive for the patient. September 19, 2016. Patient is here in followup. He mostly spends time in [place] and has moved to [place]. He is hoping to eventually permanently move to [place], but his wife is still a little resistant. His activity level has decreased and his weight has gone up. He was able to play tennis 3 times for about an hour.  He is New York Heart Association class 2-3 at this point. No chest pressure, tightness, or heaviness. He saw neurology for intentional tremor and has a followup appointment tomorrow. He also went for a sleep study and has very mild sleep apnea and refuses to use the CPAP machine. October 20, 2017. First visit in a year. He had switched to Dr. Meneses for internal medicine, and because Dr. Meneses is also a cardiologist, he was seeing him exclusively initially, but now is back here. He claims his labs were okay also with Dr. Mik Alvarenga, whom he has been seeing for gout and is now on allopurinol and colchicine. No change in exercise tolerance or capacity. Weight is actually down 9 pounds from last year. EKG and exam are the same. October 30, 2018. First visit in a year once again. Absolutely no medical issues over the past 12 months. The only change is Dr. Meneses added 1 g of fish oil for triglycerides about 6-8 weeks ago and in Dr. Alvarenga increased allopurinol to 300 mg daily, and Colchrys every other day for his gout. Plan one tennis now than before because he is in [place] for half a year. Has lost weight and has absolutely no symptoms. Discussed the pros and cons of stress testing at this point, but we'll probably hold off. September 10, 2019.  Patient here in follow-up, this time came with his wife.  No new symptoms.  Still playing tennis a few days a week.  Gained back the 6 pounds he lost but otherwise no complaints.  His allopurinol is up to 300 mg and he is on colchicine.  Labs from Dr. Mik Alvarenga dated September 4 included a normal BMP and a normal CBC.  His uric acid was down to 4.6.  His CRP is 1.6 and his vitamin B12 is 407.  Lipids and a BNP were not done.  No chest pain, shortness of breath, syncope or near syncope.  EKG is sinus bradycardia at 56 with a first-degree block and is unchanged.  He also follows up with Dr. Alanis Meneses as internal medicine. Tremor is improved.. July 23, 2020.  Patient here in follow-up.  Had his annual wellness visit with Dr. Castro on December 30.  His triglycerides was 440 and he recommended fenofibrate plus changing the simvastatin to atorvastatin. Still playing tennis twice a week except for the first month of COVID.  Back from [place] for 1 month now.  No symptoms of COVID.  Essential tremor may be a little worse and having some issues with his memory.  Had a previous work-up with Dr. Orlin Freeman of neurology.  Here for labs, (only had half a plum this morning) August 4, 2021.  First follow-up in 1 year.  Saw Dr. Meneses in December.  No complaints and ECG unchanged.  Had labs with cholesterol 181, HDL 41, , triglycerides 139.(First time LDL above 100 since 2015).  The labs okay but no thyroid function checked.  EKG today is sinus rhythm at 54 with a first-degree AV block is 0.28 and persisting QS in V2-4 and overall poor R wave progression.  His internist Dr. Alanis Meneses retired.  Also his wife may be retiring and they have for they may be spending much more time in [place] and less time up here so he wanted the name of cardiologist in [place].  He still plays tennis 3 times a week and no change overall but his weight did go up September 14, 2021.Patient return for stress echocardiogram.  Exercised for 7 minutes to a heart rate of 108 and a blood pressure of 158/66.  Fatigue and shortness of breath but no chest pain.  Did have 1 mm of horizontal ST depressions in leads II, III, aVF, V4 through 6 and this persisted for 6 minutes of recovery.  No echocardiographic evidence of ischemia although it is difficult with his fixed wall motion abnormality from his previous infarct.  Compared with his previous stress echo from a number of years ago he did not have these ST changes.  Long discussion with the patient given no symptoms and no evidence of ischemia on the echo however I feel we still need to rule out possible ischemia.  I offered to have him come back for a nuclear stress test or a CT angiogram and because he will be going back to [place] and only coming up for a brief period of time in October he will schedule the CT angiogram when he returns in October.  I advised him to restrict his activity somewhat until this is done.  October 12, 2021.  Had CT angio of his coronary arteries.  Patent LAD stent.  Beyond the stent moderate stenosis due to calcific and noncalcific plaque.  Dense calcification and motion artifact so could not evaluate proximal circumflex.  Similar for distal RCA.  LVEF 30%. Reviewed CTA with the patient.  Suboptimal study but stent in LAD is patent.  Possibly some degree of narrowing distal to the stent.  Other arteries are either not well seen or when seen no severe obstruction.  Only concern is LVEF calculated at 30% but it was a suboptimal study.  Patient is stable clinically and therefore no change in medications at this time.  We will follow up when he returns from [place]  Liseth 15, 2022.  First return from [place].  EKG today with sinus bradycardia at 56, first-degree AV block of 0.29, QS V1 to 4 unchanged along with T wave inversion in aVL.  Only new issue is essential tremor mostly right hand occasionally head and has just started on Topamax 25 mg daily will be going up to 25 twice a day soon.  Absolutely no symptoms of CHF or angina, no palpitations syncope or near syncope.  No other change in medication and no COVID issues while in [place].  Needs clearance for colonoscopy which will be in October. August 18, 2023.  First visit in over 1 year.  Was here for 3 months and is going back to [place] in about a week.  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  No issues with heart failure chest pain etc.  Denies any change in his exercise capacity.  Still plays tennis when he is down in [place] but has not played now for the 3 months he is up here.  Weight did go up a little.  No COVID issues etc.  EKG is sinus rhythm at 66, marked first-degree AV block of 0.3-4.  Poor R wave progression and low voltage in the limb leads unchanged.  Last stress echo was 2021. July 22, 2024.  Patient returns after almost a year.  Stable from a cardiac point of view with his biggest problem still his essential tremor.  After discussion with one of his doctors down in [place] he is now on Zetia in place of the fenofibrate so we will check his lipid profile including his triglycerides which were an issue in the past.  He still plays tennis and is still getting around without any chest pain or change in exercise capacity.  There have been no interval hospitalizations or ER visits.  He just had a couple of visits to ENT.  Blood pressure is still excellent, weight borderline stable, EKG sinus rhythm at 62 with a marked first-degree AV block, left anterior hemiblock, poor R wave progression with old anterior MI, mild IVCD unchanged.

## 2024-07-22 NOTE — DISCUSSION/SUMMARY
[FreeTextEntry1] : Labs checked as mentioned.If issues with proBNP we will bring patient back for echo and may be even stress echo but otherwise we will continue current medications and just follow-up next June if he is stable until Labor Day when he returns to [place]. [EKG obtained to assist in diagnosis and management of assessed problem(s)] : EKG obtained to assist in diagnosis and management of assessed problem(s)

## 2024-07-22 NOTE — REVIEW OF SYSTEMS
[Tremor] : a tremor was seen [Negative] : Heme/Lymph [Weight Gain (___ Lbs)] : no recent weight gain [Weight Loss (___ Lbs)] : no recent weight loss [de-identified] : Bilateral intention tremor

## 2024-07-22 NOTE — ASSESSMENT
[FreeTextEntry1] : The patient continues to do well since his large anterior wall MI in 1997. His blood pressure usually runs low but he has absolutely no symptoms of lightheadedness or fatigue. His EKG shows sinus (vs. low atrial) at 56 with longer first degree AV block, with low voltage and signs of his anterior MI and is otherwise unchanged. He is very compliant with his meds and was doing better with weight loss and exercise as he is playing more tennis, etc. yet somehow he put on a few pounds. Spends more than 6 months in [place] now so he claims when he is with his wife he eats more because she cooks when he is in [place] by himself he eats less. Will probably be spending even more time in [place] now. Not quite as active as he used to be. In August, 2014 he was able to do 9 minutes on the treadmill without any symptoms, arrhythmias, or EKG changes but October, 2016 fatigued after 7 minutes; would probably be better now. The echocardiogram was unchanged showing his old anterior MI but the rest seemed within normal limits. 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 He has an essential tremor of his head and his hand, but no signs of Parkinsonism on followup with Neurology. Still able to play tennis despite the tremor. (His last colonoscopy was more than 10 years ago and he claims he had no polyps and there is no family history of colon cancer so he should have a ten-year interval between colonoscopies. This was up to Dr. Meneses who retired in July, 2021 however. Will probably use one of the other doctors in the group.) He is being treated for gout, by Dr. Mik Alvarenga, and his allopurinol has been increased to 300 mg along with his Colchrys 0.6 mg daily. We will check labs including a CK for the combination of atorvastatin and fenofibrate. Last stress echo 5 years ago so we repeated it and the EKG part seemed more abnormal although the rest was unchanged. CTA was done as mentioned above and was fairly suboptimal unfortunately. LVEF calculated at 30% but patient has been totally asymptomatic with no change in exercise capacity or shortness of breath and never had any incidence of arrhythmias. For now we are continuing his current medical regimen. If labs are okay he should be okay for colonoscopy but will have to change the clopidogrel to Plavix for aspirin 5 days before Patient returned August 18, 2023. 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 No exertional shortness of breath or chest pain. No palpitations, syncope or near syncope. Cardiac and physical exam unchanged. EKG unchanged, still sinus rhythm with a marked first-degree AV block.(The first-degree block has prolonged more). Only on 5 mg of bisoprolol but this is probably helping his intention tremor as well. No symptoms of dizziness syncope or near syncope. Still fairly active including playing doubles tennis in [place]. February 23, 2024. Essential tremor getting worse so neurologist suggested changing bisoprolol to propranolol and I am okay with that. No history of asthma etc. Will change to 5 mg of bisoprolol to propranolol LA 80 and patient will call if he has any side effects. Otherwise he will return for follow-up in about 3 months when he returns to [place].  April 4, 2024. Patient called from [place] as the doctor there wanted to put him on Zetia. His latest labs have cholesterol 184, triglycerides 156, HDL 39, . I did discuss with him that his LDLs had usually been in the 90s and therefore part of this is probably dietary. I had wanted to give him Zetia in 2015 but at that time the cost was prohibitive for him. Now there is a generic. There is a worrisome interaction with the fenofibrate as well; in the past he has had extremely high triglycerides although has never had pancreatitis. I decided that he could try the Zetia and hold the fenofibrate and then get better diet and repeat the labs in 2 months including fasting lipids to see where his triglycerides are without the fenofibrate. Then we will reevaluate. The other labs had a hemoglobin A1c of 6.4 so we will try to get his LDL below 70 once again he had other labs including a normal protein electrophoresis, copper and zinc for some reason were checked. His iron studies were okay. His CBC was okay. His B12 and folate were okay. He had a negative rheumatoid factor negative CRP and negative Sjogren's antibody. Normal thyroid functions, negative RPR, PSA 2.75.   Patient returns today together with his wife, July 22, 2024. From a cardiac point of view he is active with no change in his activity level his tennis playing and no complaints.  Still issues with his essential tremor.  Reviewed and corrected all his medications and he is now on Zetia in place of fenofibrate for lipids and triglycerides will be checked.  Stable EKG and exam although his first-degree AV block seems to be progressing. He is on propranolol ER 80 for his tremor not for his heart but there has been no syncope or near syncope.  Other new medicine is Jardiance and he has some coarse skin and some ecchymoses and was wondering if that was from the Jardiance which is unlikely. He asked for uric acid and PSA to be checked.  He will be going back to [place] all around Labor Day and then returning next June.

## 2024-07-22 NOTE — PHYSICAL EXAM
[General Appearance - Well Developed] : well developed [Normal Appearance] : normal appearance [Well Groomed] : well groomed [General Appearance - Well Nourished] : well nourished [No Deformities] : no deformities [General Appearance - In No Acute Distress] : no acute distress [Normal Conjunctiva] : the conjunctiva exhibited no abnormalities [Eyelids - No Xanthelasma] : the eyelids demonstrated no xanthelasmas [Normal Oral Mucosa] : normal oral mucosa [No Oral Pallor] : no oral pallor [No Oral Cyanosis] : no oral cyanosis [Normal Jugular Venous A Waves Present] : normal jugular venous A waves present [Normal Jugular Venous V Waves Present] : normal jugular venous V waves present [No Jugular Venous Bean A Waves] : no jugular venous bean A waves [Respiration, Rhythm And Depth] : normal respiratory rhythm and effort [Exaggerated Use Of Accessory Muscles For Inspiration] : no accessory muscle use [Auscultation Breath Sounds / Voice Sounds] : lungs were clear to auscultation bilaterally [Heart Rate And Rhythm] : heart rate and rhythm were normal [Heart Sounds] : normal S1 and S2 [Murmurs] : no murmurs present [Abdomen Soft] : soft [Abdomen Tenderness] : non-tender [Abdomen Mass (___ Cm)] : no abdominal mass palpated [Abnormal Walk] : normal gait [Gait - Sufficient For Exercise Testing] : the gait was sufficient for exercise testing [Nail Clubbing] : no clubbing of the fingernails [Cyanosis, Localized] : no localized cyanosis [Petechial Hemorrhages (___cm)] : no petechial hemorrhages [Skin Color & Pigmentation] : normal skin color and pigmentation [] : no rash [No Venous Stasis] : no venous stasis [Skin Lesions] : no skin lesions [No Skin Ulcers] : no skin ulcer [No Xanthoma] : no  xanthoma was observed [Oriented To Time, Place, And Person] : oriented to person, place, and time [Affect] : the affect was normal [Mood] : the mood was normal [No Anxiety] : not feeling anxious [FreeTextEntry1] : No edema. Pedal pulses intact at 1+. No cogwheeling.  Does have a slight intention tremor of the right upper extremity, worse with activity.

## 2024-07-23 LAB
ALBUMIN SERPL ELPH-MCNC: 4.5 G/DL
ALP BLD-CCNC: 99 U/L
ALT SERPL-CCNC: 20 U/L
ANION GAP SERPL CALC-SCNC: 16 MMOL/L
AST SERPL-CCNC: 21 U/L
BASOPHILS # BLD AUTO: 0.05 K/UL
BASOPHILS NFR BLD AUTO: 0.5 %
BILIRUB SERPL-MCNC: 0.8 MG/DL
BUN SERPL-MCNC: 16 MG/DL
CALCIUM SERPL-MCNC: 9.4 MG/DL
CHLORIDE SERPL-SCNC: 102 MMOL/L
CHOLEST SERPL-MCNC: 173 MG/DL
CO2 SERPL-SCNC: 21 MMOL/L
CREAT SERPL-MCNC: 0.9 MG/DL
EGFR: 88 ML/MIN/1.73M2
EOSINOPHIL # BLD AUTO: 0.2 K/UL
EOSINOPHIL NFR BLD AUTO: 2.1 %
ESTIMATED AVERAGE GLUCOSE: 137 MG/DL
GLUCOSE SERPL-MCNC: 108 MG/DL
HBA1C MFR BLD HPLC: 6.4 %
HCT VFR BLD CALC: 52.2 %
HDLC SERPL-MCNC: 39 MG/DL
HGB BLD-MCNC: 16.9 G/DL
IMM GRANULOCYTES NFR BLD AUTO: 1.2 %
LDLC SERPL CALC-MCNC: 84 MG/DL
LYMPHOCYTES # BLD AUTO: 1.8 K/UL
LYMPHOCYTES NFR BLD AUTO: 19 %
MAN DIFF?: NORMAL
MCHC RBC-ENTMCNC: 31.5 PG
MCHC RBC-ENTMCNC: 32.4 GM/DL
MCV RBC AUTO: 97.2 FL
MONOCYTES # BLD AUTO: 0.72 K/UL
MONOCYTES NFR BLD AUTO: 7.6 %
NEUTROPHILS # BLD AUTO: 6.61 K/UL
NEUTROPHILS NFR BLD AUTO: 69.6 %
NONHDLC SERPL-MCNC: 134 MG/DL
NT-PROBNP SERPL-MCNC: 338 PG/ML
PLATELET # BLD AUTO: 172 K/UL
POTASSIUM SERPL-SCNC: 5 MMOL/L
PROT SERPL-MCNC: 7.3 G/DL
PSA SERPL-MCNC: 3.65 NG/ML
RBC # BLD: 5.37 M/UL
RBC # FLD: 13.7 %
SODIUM SERPL-SCNC: 138 MMOL/L
TRIGL SERPL-MCNC: 304 MG/DL
TSH SERPL-ACNC: 1.03 UIU/ML
URATE SERPL-MCNC: 4.7 MG/DL
WBC # FLD AUTO: 9.49 K/UL

## 2025-01-06 ENCOUNTER — RX RENEWAL (OUTPATIENT)
Age: 80
End: 2025-01-06

## 2025-01-07 RX ORDER — EMPAGLIFLOZIN 10 MG/1
10 TABLET, FILM COATED ORAL
Qty: 90 | Refills: 1 | Status: ACTIVE | COMMUNITY
Start: 2025-01-06 | End: 1900-01-01

## 2025-03-19 ENCOUNTER — RX RENEWAL (OUTPATIENT)
Age: 80
End: 2025-03-19

## 2025-07-28 ENCOUNTER — NON-APPOINTMENT (OUTPATIENT)
Age: 80
End: 2025-07-28

## 2025-07-29 ENCOUNTER — RX RENEWAL (OUTPATIENT)
Age: 80
End: 2025-07-29

## 2025-07-29 ENCOUNTER — APPOINTMENT (OUTPATIENT)
Dept: CARDIOLOGY | Facility: CLINIC | Age: 80
End: 2025-07-29
Payer: MEDICARE

## 2025-07-29 VITALS
OXYGEN SATURATION: 97 % | HEART RATE: 58 BPM | WEIGHT: 195 LBS | DIASTOLIC BLOOD PRESSURE: 60 MMHG | SYSTOLIC BLOOD PRESSURE: 108 MMHG | BODY MASS INDEX: 31.47 KG/M2

## 2025-07-29 DIAGNOSIS — I25.10 ATHEROSCLEROTIC HEART DISEASE OF NATIVE CORONARY ARTERY W/OUT ANGINA PECTORIS: ICD-10-CM

## 2025-07-29 DIAGNOSIS — Z01.810 ENCOUNTER FOR PREPROCEDURAL CARDIOVASCULAR EXAMINATION: ICD-10-CM

## 2025-07-29 DIAGNOSIS — I50.22 CHRONIC SYSTOLIC (CONGESTIVE) HEART FAILURE: ICD-10-CM

## 2025-07-29 DIAGNOSIS — I10 ESSENTIAL (PRIMARY) HYPERTENSION: ICD-10-CM

## 2025-07-29 DIAGNOSIS — G25.0 ESSENTIAL TREMOR: ICD-10-CM

## 2025-07-29 PROCEDURE — 99215 OFFICE O/P EST HI 40 MIN: CPT

## 2025-07-29 PROCEDURE — 93000 ELECTROCARDIOGRAM COMPLETE: CPT

## 2025-07-29 PROCEDURE — G2211 COMPLEX E/M VISIT ADD ON: CPT

## 2025-09-02 ENCOUNTER — NON-APPOINTMENT (OUTPATIENT)
Age: 80
End: 2025-09-02

## 2025-09-03 ENCOUNTER — APPOINTMENT (OUTPATIENT)
Dept: CARDIOLOGY | Facility: CLINIC | Age: 80
End: 2025-09-03
Payer: MEDICARE

## 2025-09-03 PROCEDURE — 93306 TTE W/DOPPLER COMPLETE: CPT | Mod: 59

## 2025-09-03 PROCEDURE — 93351 STRESS TTE COMPLETE: CPT
